# Patient Record
Sex: FEMALE | Race: WHITE | URBAN - METROPOLITAN AREA
[De-identification: names, ages, dates, MRNs, and addresses within clinical notes are randomized per-mention and may not be internally consistent; named-entity substitution may affect disease eponyms.]

---

## 2017-06-06 ENCOUNTER — HISTORICAL (OUTPATIENT)
Dept: ADMINISTRATIVE | Facility: HOSPITAL | Age: 62
End: 2017-06-06

## 2017-06-06 LAB
ABS NEUT (OLG): 4.9 X10(3)/MCL (ref 2.1–9.2)
BASOPHILS # BLD AUTO: 0.1 X10(3)/MCL
BASOPHILS NFR BLD AUTO: 1 % (ref 0–2)
BUN SERPL-MCNC: 26 MG/DL (ref 7–18)
CALCIUM SERPL-MCNC: 9 MG/DL (ref 8.5–10.1)
CHLORIDE SERPL-SCNC: 109 MMOL/L (ref 98–107)
CO2 SERPL-SCNC: 33 MMOL/L (ref 21–32)
CREAT SERPL-MCNC: 0.88 MG/DL (ref 0.6–1.3)
EOSINOPHIL # BLD AUTO: 0.2 X10(3)/MCL
EOSINOPHIL NFR BLD AUTO: 2 %
ERYTHROCYTE [DISTWIDTH] IN BLOOD BY AUTOMATED COUNT: 17.6 % (ref 11.5–17)
GLUCOSE SERPL-MCNC: 87 MG/DL (ref 74–106)
HCT VFR BLD AUTO: 33.8 % (ref 37–47)
HGB BLD-MCNC: 10.4 GM/DL (ref 12–16)
LYMPHOCYTES # BLD AUTO: 1.9 X10(3)/MCL
LYMPHOCYTES NFR BLD AUTO: 24 % (ref 13–40)
MCH RBC QN AUTO: 26.5 PG (ref 27–31)
MCHC RBC AUTO-ENTMCNC: 30.9 GM/DL (ref 33–36)
MCV RBC AUTO: 86 FL (ref 80–94)
MONOCYTES # BLD AUTO: 0.6 X10(3)/MCL
MONOCYTES NFR BLD AUTO: 8 % (ref 2–11)
NEUTROPHILS # BLD AUTO: 4.9 X10(3)/MCL (ref 2.1–9.2)
NEUTROPHILS NFR BLD AUTO: 64 % (ref 47–80)
PLATELET # BLD AUTO: 347 X10(3)/MCL (ref 130–400)
PMV BLD AUTO: 9.5 FL (ref 7.4–10.4)
POTASSIUM SERPL-SCNC: 5.3 MMOL/L (ref 3.5–5.1)
RBC # BLD AUTO: 3.93 X10(6)/MCL (ref 4.2–5.4)
SODIUM SERPL-SCNC: 146 MMOL/L (ref 136–145)
WBC # SPEC AUTO: 7.7 X10(3)/MCL (ref 4.5–11.5)

## 2018-02-05 ENCOUNTER — HOSPITAL ENCOUNTER (OUTPATIENT)
Dept: MEDSURG UNIT | Facility: HOSPITAL | Age: 63
End: 2018-02-07
Attending: INTERNAL MEDICINE | Admitting: INTERNAL MEDICINE

## 2018-02-06 LAB
BNP BLD-MCNC: 1117 PG/ML (ref 0–125)
PHENYTOIN SERPL-MCNC: 0.4 MCG/ML (ref 10–20)

## 2018-02-07 LAB
ABS NEUT (OLG): 8.8 X10(3)/MCL (ref 2.1–9.2)
ALBUMIN SERPL-MCNC: 3.1 GM/DL (ref 3.4–5)
ALBUMIN/GLOB SERPL: 0.8 RATIO (ref 1.1–2)
ALP SERPL-CCNC: 129 UNIT/L (ref 46–116)
ALT SERPL-CCNC: 38 UNIT/L (ref 12–78)
AMMONIA PLAS-MSCNC: <14 MCG/DL (ref 14–44.8)
AST SERPL-CCNC: 20 UNIT/L (ref 15–37)
BASOPHILS # BLD AUTO: 0.1 X10(3)/MCL
BASOPHILS NFR BLD AUTO: 1 % (ref 0–2)
BILIRUB SERPL-MCNC: 0.6 MG/DL (ref 0.2–1)
BILIRUBIN DIRECT+TOT PNL SERPL-MCNC: 0.3 MG/DL (ref 0–0.2)
BILIRUBIN DIRECT+TOT PNL SERPL-MCNC: 0.31 MG/DL (ref 0–0.8)
BUN SERPL-MCNC: 13.4 MG/DL (ref 7–18)
CALCIUM SERPL-MCNC: 9.2 MG/DL (ref 8.5–10.1)
CHLORIDE SERPL-SCNC: 107 MMOL/L (ref 98–107)
CO2 SERPL-SCNC: 30.1 MMOL/L (ref 21–32)
CREAT SERPL-MCNC: 0.8 MG/DL (ref 0.6–1.3)
EOSINOPHIL NFR BLD AUTO: 0 %
ERYTHROCYTE [DISTWIDTH] IN BLOOD BY AUTOMATED COUNT: 19.9 % (ref 11.5–17)
GLOBULIN SER-MCNC: 3.8 GM/DL (ref 2.4–3.5)
GLUCOSE SERPL-MCNC: 103 MG/DL (ref 74–106)
HCT VFR BLD AUTO: 34.7 % (ref 37–47)
HGB BLD-MCNC: 10.6 GM/DL (ref 12–16)
LYMPHOCYTES # BLD AUTO: 1.4 X10(3)/MCL
LYMPHOCYTES NFR BLD AUTO: 13 % (ref 13–40)
MCH RBC QN AUTO: 22 PG (ref 27–31)
MCHC RBC AUTO-ENTMCNC: 30.5 GM/DL (ref 33–36)
MCV RBC AUTO: 72.3 FL (ref 80–94)
MONOCYTES # BLD AUTO: 0.7 X10(3)/MCL
MONOCYTES NFR BLD AUTO: 7 % (ref 2–11)
NEUTROPHILS # BLD AUTO: 8.8 X10(3)/MCL (ref 2.1–9.2)
NEUTROPHILS NFR BLD AUTO: 80 % (ref 47–80)
PLATELET # BLD AUTO: 352 X10(3)/MCL (ref 130–400)
PMV BLD AUTO: 9.5 FL (ref 7.4–10.4)
POTASSIUM SERPL-SCNC: 2.8 MMOL/L (ref 3.5–5.1)
PROT SERPL-MCNC: 6.9 GM/DL (ref 6.4–8.2)
RBC # BLD AUTO: 4.8 X10(6)/MCL (ref 4.2–5.4)
SODIUM SERPL-SCNC: 150 MMOL/L (ref 136–145)
WBC # SPEC AUTO: 11 X10(3)/MCL (ref 4.5–11.5)

## 2018-05-16 ENCOUNTER — HOSPITAL ENCOUNTER (OUTPATIENT)
Dept: MEDSURG UNIT | Facility: HOSPITAL | Age: 63
End: 2018-05-17
Attending: INTERNAL MEDICINE | Admitting: INTERNAL MEDICINE

## 2018-05-16 LAB
GROUP & RH: NORMAL
PRODUCT READY: NORMAL
TRANSFUSION ORDER: NORMAL

## 2018-05-17 LAB
ABS NEUT (OLG): 9.4 X10(3)/MCL (ref 2.1–9.2)
ANISOCYTOSIS BLD QL SMEAR: NORMAL
BASOPHILS # BLD AUTO: 0.1 X10(3)/MCL
BASOPHILS NFR BLD AUTO: 1 % (ref 0–2)
EOSINOPHIL # BLD AUTO: 0.2 X10(3)/MCL
EOSINOPHIL NFR BLD AUTO: 2 %
ERYTHROCYTE [DISTWIDTH] IN BLOOD BY AUTOMATED COUNT: 20.8 % (ref 11.5–17)
HCT VFR BLD AUTO: 28.2 % (ref 37–47)
HGB BLD-MCNC: 8.8 GM/DL (ref 12–16)
LYMPHOCYTES # BLD AUTO: 0.6 X10(3)/MCL
LYMPHOCYTES NFR BLD AUTO: 6 % (ref 13–40)
MCH RBC QN AUTO: 24.8 PG (ref 27–31)
MCHC RBC AUTO-ENTMCNC: 31 GM/DL (ref 33–36)
MCV RBC AUTO: 80 FL (ref 80–94)
MONOCYTES # BLD AUTO: 0.6 X10(3)/MCL
MONOCYTES NFR BLD AUTO: 5 % (ref 2–11)
NEUTROPHILS # BLD AUTO: 9.4 X10(3)/MCL (ref 2.1–9.2)
NEUTROPHILS NFR BLD AUTO: 86 % (ref 47–80)
PLATELET # BLD AUTO: 142 X10(3)/MCL (ref 130–400)
PLATELET # BLD EST: NORMAL 10*3/UL
PMV BLD AUTO: 10.4 FL (ref 7.4–10.4)
POLYCHROMASIA BLD QL SMEAR: NORMAL
RBC # BLD AUTO: 3.53 X10(6)/MCL (ref 4.2–5.4)
RBC MORPH BLD: NORMAL
WBC # SPEC AUTO: 10.9 X10(3)/MCL (ref 4.5–11.5)

## 2018-05-23 LAB
FINAL CULTURE: NORMAL
FINAL CULTURE: NORMAL

## 2018-06-19 ENCOUNTER — HISTORICAL (OUTPATIENT)
Dept: INFUSION THERAPY | Facility: HOSPITAL | Age: 63
End: 2018-06-19

## 2018-06-25 ENCOUNTER — HISTORICAL (OUTPATIENT)
Dept: INFUSION THERAPY | Facility: HOSPITAL | Age: 63
End: 2018-06-25

## 2018-07-02 ENCOUNTER — HISTORICAL (OUTPATIENT)
Dept: INFUSION THERAPY | Facility: HOSPITAL | Age: 63
End: 2018-07-02

## 2018-07-03 ENCOUNTER — HISTORICAL (OUTPATIENT)
Dept: WOUND CARE | Facility: HOSPITAL | Age: 63
End: 2018-07-03

## 2018-07-05 ENCOUNTER — HISTORICAL (OUTPATIENT)
Dept: RADIOLOGY | Facility: HOSPITAL | Age: 63
End: 2018-07-05

## 2019-11-26 ENCOUNTER — HISTORICAL (OUTPATIENT)
Dept: LAB | Facility: HOSPITAL | Age: 64
End: 2019-11-26

## 2019-11-26 LAB
APPEARANCE, UA: ABNORMAL
BACTERIA SPEC CULT: ABNORMAL
BILIRUB UR QL STRIP: NEGATIVE
COLOR UR: YELLOW
GLUCOSE (UA): NEGATIVE
HGB UR QL STRIP: ABNORMAL
HYALINE CASTS #/AREA URNS LPF: ABNORMAL /[LPF]
KETONES UR QL STRIP: NEGATIVE
LEUKOCYTE ESTERASE UR QL STRIP: ABNORMAL
NITRITE UR QL STRIP: POSITIVE
PH UR STRIP: 6 [PH] (ref 5–9)
PROT UR QL STRIP: 30
RBC #/AREA URNS HPF: ABNORMAL /HPF
SP GR UR STRIP: 1.02 (ref 1–1.03)
SQUAMOUS EPITHELIAL, UA: ABNORMAL
UROBILINOGEN UR STRIP-ACNC: 0.2
WBC #/AREA URNS HPF: ABNORMAL /HPF

## 2020-01-23 LAB
ABS NEUT (OLG): 4.71 X10(3)/MCL (ref 2.1–9.2)
BASOPHILS # BLD AUTO: 0.01 X10(3)/MCL (ref 0–0.2)
BASOPHILS NFR BLD AUTO: 0.1 % (ref 0–1)
BUN SERPL-MCNC: 15 MG/DL (ref 7–18)
CALCIUM SERPL-MCNC: 7.9 MG/DL (ref 8.5–10.1)
CHLORIDE SERPL-SCNC: 105 MMOL/L (ref 98–107)
CO2 SERPL-SCNC: 39 MMOL/L (ref 21–32)
CREAT SERPL-MCNC: 0.61 MG/DL (ref 0.55–1.02)
CREAT/UREA NIT SERPL: 25
EOSINOPHIL # BLD AUTO: 0.01 X10(3)/MCL (ref 0–0.9)
EOSINOPHIL NFR BLD AUTO: 0.1 % (ref 0–6.4)
ERYTHROCYTE [DISTWIDTH] IN BLOOD BY AUTOMATED COUNT: 15.9 % (ref 11.5–17)
GLUCOSE SERPL-MCNC: 87 MG/DL (ref 74–106)
HCT VFR BLD AUTO: 37.5 % (ref 37–47)
HGB BLD-MCNC: 11.3 GM/DL (ref 12–16)
IMM GRANULOCYTES # BLD AUTO: 0.12 10*3/UL (ref 0–0.02)
IMM GRANULOCYTES NFR BLD AUTO: 1.8 % (ref 0–0.43)
LYMPHOCYTES # BLD AUTO: 1.48 X10(3)/MCL (ref 0.6–4.6)
LYMPHOCYTES NFR BLD AUTO: 21.9 % (ref 16–44)
MCH RBC QN AUTO: 28.5 PG (ref 27–31)
MCHC RBC AUTO-ENTMCNC: 30.1 GM/DL (ref 33–36)
MCV RBC AUTO: 94.7 FL (ref 80–94)
MONOCYTES # BLD AUTO: 0.42 X10(3)/MCL (ref 0.1–1.3)
MONOCYTES NFR BLD AUTO: 6.2 % (ref 4–12.1)
NEUTROPHILS # BLD AUTO: 4.71 X10(3)/MCL (ref 2.1–9.2)
NEUTROPHILS NFR BLD AUTO: 69.9 % (ref 43–73)
NRBC BLD AUTO-RTO: 0 % (ref 0–0.2)
PLATELET # BLD AUTO: 227 X10(3)/MCL (ref 130–400)
PMV BLD AUTO: 10.6 FL (ref 7.4–10.4)
POTASSIUM SERPL-SCNC: 3.1 MMOL/L (ref 3.5–5.1)
PREALB SERPL-MCNC: 28.1 MG/DL (ref 20–40)
RBC # BLD AUTO: 3.96 X10(6)/MCL (ref 4.2–5.4)
SODIUM SERPL-SCNC: 145 MMOL/L (ref 136–145)
WBC # SPEC AUTO: 6.8 X10(3)/MCL (ref 4.5–11.5)

## 2020-01-24 LAB
BUN SERPL-MCNC: 22 MG/DL (ref 7–18)
CALCIUM SERPL-MCNC: 8.2 MG/DL (ref 8.5–10.1)
CHLORIDE SERPL-SCNC: 108 MMOL/L (ref 98–107)
CO2 SERPL-SCNC: 34 MMOL/L (ref 21–32)
CREAT SERPL-MCNC: 0.78 MG/DL (ref 0.55–1.02)
CREAT/UREA NIT SERPL: 28
GLUCOSE SERPL-MCNC: 129 MG/DL (ref 74–106)
MAGNESIUM SERPL-MCNC: 2.1 MG/DL (ref 1.8–2.4)
POTASSIUM SERPL-SCNC: 4.6 MMOL/L (ref 3.5–5.1)
SODIUM SERPL-SCNC: 145 MMOL/L (ref 136–145)

## 2020-01-27 LAB
ABS NEUT (OLG): 10.24 X10(3)/MCL (ref 2.1–9.2)
ANISOCYTOSIS BLD QL SMEAR: ABNORMAL
APPEARANCE, UA: CLEAR
BASOPHILS # BLD AUTO: 0.05 X10(3)/MCL (ref 0–0.2)
BASOPHILS NFR BLD AUTO: 0.4 % (ref 0–1)
BILIRUB UR QL STRIP: NEGATIVE
BUN SERPL-MCNC: 36 MG/DL (ref 7–18)
CALCIUM SERPL-MCNC: 8.8 MG/DL (ref 8.5–10.1)
CHLORIDE SERPL-SCNC: 101 MMOL/L (ref 98–107)
CO2 SERPL-SCNC: 37 MMOL/L (ref 21–32)
COLOR UR: YELLOW
CREAT SERPL-MCNC: 0.72 MG/DL (ref 0.55–1.02)
CREAT/UREA NIT SERPL: 50
EOSINOPHIL # BLD AUTO: 0.02 X10(3)/MCL (ref 0–0.9)
EOSINOPHIL NFR BLD AUTO: 0.2 % (ref 0–6.4)
ERYTHROCYTE [DISTWIDTH] IN BLOOD BY AUTOMATED COUNT: 16 % (ref 11.5–17)
GLUCOSE (UA): NEGATIVE
GLUCOSE SERPL-MCNC: 110 MG/DL (ref 74–106)
HCT VFR BLD AUTO: 35.6 % (ref 37–47)
HGB BLD-MCNC: 10.6 GM/DL (ref 12–16)
HGB UR QL STRIP: NEGATIVE
HYPOCHROMIA BLD QL SMEAR: ABNORMAL
IMM GRANULOCYTES # BLD AUTO: 0.32 10*3/UL (ref 0–0.02)
IMM GRANULOCYTES NFR BLD AUTO: 2.4 % (ref 0–0.43)
KETONES UR QL STRIP: NEGATIVE
LEUKOCYTE ESTERASE UR QL STRIP: NEGATIVE
LYMPHOCYTES # BLD AUTO: 1.88 X10(3)/MCL (ref 0.6–4.6)
LYMPHOCYTES NFR BLD AUTO: 14.3 % (ref 16–44)
MCH RBC QN AUTO: 27.7 PG (ref 27–31)
MCHC RBC AUTO-ENTMCNC: 29.8 GM/DL (ref 33–36)
MCV RBC AUTO: 93.2 FL (ref 80–94)
MONOCYTES # BLD AUTO: 0.63 X10(3)/MCL (ref 0.1–1.3)
MONOCYTES NFR BLD AUTO: 4.8 % (ref 4–12.1)
NEUTROPHILS # BLD AUTO: 10.24 X10(3)/MCL (ref 2.1–9.2)
NEUTROPHILS NFR BLD AUTO: 77.9 % (ref 43–73)
NITRITE UR QL STRIP: NEGATIVE
NRBC BLD AUTO-RTO: 0 % (ref 0–0.2)
PH UR STRIP: 6 [PH] (ref 5–7)
PLATELET # BLD AUTO: 245 X10(3)/MCL (ref 130–400)
PLATELET # BLD EST: ADEQUATE 10*3/UL
PMV BLD AUTO: 11.7 FL (ref 7.4–10.4)
POTASSIUM SERPL-SCNC: 4.3 MMOL/L (ref 3.5–5.1)
PROT UR QL STRIP: NEGATIVE
RBC # BLD AUTO: 3.82 X10(6)/MCL (ref 4.2–5.4)
RBC MORPH BLD: ABNORMAL
SODIUM SERPL-SCNC: 139 MMOL/L (ref 136–145)
SP GR UR STRIP: 1.01 (ref 1–1.03)
UROBILINOGEN UR STRIP-ACNC: NEGATIVE
WBC # SPEC AUTO: 13.1 X10(3)/MCL (ref 4.5–11.5)

## 2020-01-28 LAB
ABS NEUT (OLG): 10.39 X10(3)/MCL (ref 2.1–9.2)
BASOPHILS # BLD AUTO: 0.04 X10(3)/MCL (ref 0–0.2)
BASOPHILS NFR BLD AUTO: 0.3 % (ref 0–1)
BUN SERPL-MCNC: 43 MG/DL (ref 7–18)
CALCIUM SERPL-MCNC: 8.7 MG/DL (ref 8.5–10.1)
CHLORIDE SERPL-SCNC: 102 MMOL/L (ref 98–107)
CO2 SERPL-SCNC: 34 MMOL/L (ref 21–32)
CREAT SERPL-MCNC: 0.68 MG/DL (ref 0.55–1.02)
CREAT/UREA NIT SERPL: 63
EOSINOPHIL # BLD AUTO: 0.02 X10(3)/MCL (ref 0–0.9)
EOSINOPHIL NFR BLD AUTO: 0.2 % (ref 0–6.4)
ERYTHROCYTE [DISTWIDTH] IN BLOOD BY AUTOMATED COUNT: 15.9 % (ref 11.5–17)
GLUCOSE SERPL-MCNC: 103 MG/DL (ref 74–106)
HCT VFR BLD AUTO: 35.8 % (ref 37–47)
HGB BLD-MCNC: 10.8 GM/DL (ref 12–16)
IMM GRANULOCYTES # BLD AUTO: 0.2 10*3/UL (ref 0–0.02)
IMM GRANULOCYTES NFR BLD AUTO: 1.5 % (ref 0–0.43)
LYMPHOCYTES # BLD AUTO: 1.89 X10(3)/MCL (ref 0.6–4.6)
LYMPHOCYTES NFR BLD AUTO: 14.4 % (ref 16–44)
MCH RBC QN AUTO: 28 PG (ref 27–31)
MCHC RBC AUTO-ENTMCNC: 30.2 GM/DL (ref 33–36)
MCV RBC AUTO: 92.7 FL (ref 80–94)
MONOCYTES # BLD AUTO: 0.55 X10(3)/MCL (ref 0.1–1.3)
MONOCYTES NFR BLD AUTO: 4.2 % (ref 4–12.1)
NEUTROPHILS # BLD AUTO: 10.39 X10(3)/MCL (ref 2.1–9.2)
NEUTROPHILS NFR BLD AUTO: 79.4 % (ref 43–73)
NRBC BLD AUTO-RTO: 0 % (ref 0–0.2)
PLATELET # BLD AUTO: 204 X10(3)/MCL (ref 130–400)
PMV BLD AUTO: 11.4 FL (ref 7.4–10.4)
POTASSIUM SERPL-SCNC: 4.7 MMOL/L (ref 3.5–5.1)
RBC # BLD AUTO: 3.86 X10(6)/MCL (ref 4.2–5.4)
SODIUM SERPL-SCNC: 141 MMOL/L (ref 136–145)
WBC # SPEC AUTO: 13.1 X10(3)/MCL (ref 4.5–11.5)

## 2020-02-03 ENCOUNTER — HISTORICAL (OUTPATIENT)
Dept: ADMINISTRATIVE | Facility: HOSPITAL | Age: 65
End: 2020-02-03

## 2020-02-03 LAB
ABS NEUT (OLG): 6.01 X10(3)/MCL (ref 2.1–9.2)
ALBUMIN SERPL-MCNC: 2.7 GM/DL (ref 3.4–5)
ANISOCYTOSIS BLD QL SMEAR: ABNORMAL
BUN SERPL-MCNC: 41 MG/DL (ref 7–18)
CALCIUM SERPL-MCNC: 9.1 MG/DL (ref 8.5–10.1)
CHLORIDE SERPL-SCNC: 106 MMOL/L (ref 98–107)
CO2 SERPL-SCNC: 34 MMOL/L (ref 21–32)
CREAT SERPL-MCNC: 0.74 MG/DL (ref 0.55–1.02)
ERYTHROCYTE [DISTWIDTH] IN BLOOD BY AUTOMATED COUNT: 16.6 % (ref 11.5–17)
GLUCOSE SERPL-MCNC: 87 MG/DL (ref 74–106)
HCT VFR BLD AUTO: 31.4 % (ref 37–47)
HGB BLD-MCNC: 9.4 GM/DL (ref 12–16)
HYPOCHROMIA BLD QL SMEAR: ABNORMAL
MACROCYTES BLD QL SMEAR: SLIGHT
MAGNESIUM SERPL-MCNC: 2.3 MG/DL (ref 1.8–2.4)
MCH RBC QN AUTO: 29.3 PG (ref 27–31)
MCHC RBC AUTO-ENTMCNC: 29.9 GM/DL (ref 33–36)
MCV RBC AUTO: 97.8 FL (ref 80–94)
MICROCYTES BLD QL SMEAR: SLIGHT
NRBC BLD AUTO-RTO: 0 % (ref 0–0.2)
PHOSPHATE SERPL-MCNC: 5.4 MG/DL (ref 2.5–4.9)
PLATELET # BLD AUTO: 169 X10(3)/MCL (ref 130–400)
PLATELET # BLD EST: ADEQUATE 10*3/UL
PMV BLD AUTO: 12.3 FL (ref 7.4–10.4)
POTASSIUM SERPL-SCNC: 4.7 MMOL/L (ref 3.5–5.1)
RBC # BLD AUTO: 3.21 X10(6)/MCL (ref 4.2–5.4)
RBC MORPH BLD: ABNORMAL
SODIUM SERPL-SCNC: 143 MMOL/L (ref 136–145)
WBC # SPEC AUTO: 8.4 X10(3)/MCL (ref 4.5–11.5)

## 2020-02-11 LAB
ABS NEUT (OLG): 3.99 X10(3)/MCL (ref 2.1–9.2)
BASOPHILS # BLD AUTO: 0.04 X10(3)/MCL (ref 0–0.2)
BASOPHILS NFR BLD AUTO: 0.6 % (ref 0–1)
BUN SERPL-MCNC: 77 MG/DL (ref 7–18)
CALCIUM SERPL-MCNC: 8.6 MG/DL (ref 8.5–10.1)
CHLORIDE SERPL-SCNC: 108 MMOL/L (ref 98–107)
CO2 SERPL-SCNC: 34 MMOL/L (ref 21–32)
CREAT SERPL-MCNC: 1.33 MG/DL (ref 0.55–1.02)
CREAT/UREA NIT SERPL: 58
EOSINOPHIL # BLD AUTO: 0.19 X10(3)/MCL (ref 0–0.9)
EOSINOPHIL NFR BLD AUTO: 3.1 % (ref 0–6.4)
ERYTHROCYTE [DISTWIDTH] IN BLOOD BY AUTOMATED COUNT: 17.2 % (ref 11.5–17)
GLUCOSE SERPL-MCNC: 140 MG/DL (ref 74–106)
HCT VFR BLD AUTO: 30.8 % (ref 37–47)
HGB BLD-MCNC: 9 GM/DL (ref 12–16)
HYPOCHROMIA BLD QL SMEAR: NORMAL
IMM GRANULOCYTES # BLD AUTO: 0.03 10*3/UL (ref 0–0.02)
IMM GRANULOCYTES NFR BLD AUTO: 0.5 % (ref 0–0.43)
LYMPHOCYTES # BLD AUTO: 1.38 X10(3)/MCL (ref 0.6–4.6)
LYMPHOCYTES NFR BLD AUTO: 22.3 % (ref 16–44)
MCH RBC QN AUTO: 29.4 PG (ref 27–31)
MCHC RBC AUTO-ENTMCNC: 29.2 GM/DL (ref 33–36)
MCV RBC AUTO: 100.7 FL (ref 80–94)
MONOCYTES # BLD AUTO: 0.56 X10(3)/MCL (ref 0.1–1.3)
MONOCYTES NFR BLD AUTO: 9 % (ref 4–12.1)
NEUTROPHILS # BLD AUTO: 3.99 X10(3)/MCL (ref 2.1–9.2)
NEUTROPHILS NFR BLD AUTO: 64.5 % (ref 43–73)
NRBC BLD AUTO-RTO: 0 % (ref 0–0.2)
PLATELET # BLD AUTO: 159 X10(3)/MCL (ref 130–400)
PLATELET # BLD EST: ADEQUATE 10*3/UL
PMV BLD AUTO: 12 FL (ref 7.4–10.4)
POTASSIUM SERPL-SCNC: 5.2 MMOL/L (ref 3.5–5.1)
RBC # BLD AUTO: 3.06 X10(6)/MCL (ref 4.2–5.4)
SODIUM SERPL-SCNC: 144 MMOL/L (ref 136–145)
WBC # SPEC AUTO: 6.2 X10(3)/MCL (ref 4.5–11.5)

## 2020-02-12 LAB
BUN SERPL-MCNC: 68 MG/DL (ref 7–18)
CALCIUM SERPL-MCNC: 8.9 MG/DL (ref 8.5–10.1)
CHLORIDE SERPL-SCNC: 104 MMOL/L (ref 98–107)
CO2 SERPL-SCNC: 34 MMOL/L (ref 21–32)
CREAT SERPL-MCNC: 1.28 MG/DL (ref 0.55–1.02)
CREAT/UREA NIT SERPL: 53
GLUCOSE SERPL-MCNC: 89 MG/DL (ref 74–106)
POTASSIUM SERPL-SCNC: 5 MMOL/L (ref 3.5–5.1)
SODIUM SERPL-SCNC: 143 MMOL/L (ref 136–145)

## 2020-02-16 LAB
BUN SERPL-MCNC: 41 MG/DL (ref 7–18)
CALCIUM SERPL-MCNC: 9 MG/DL (ref 8.5–10.1)
CHLORIDE SERPL-SCNC: 108 MMOL/L (ref 98–107)
CO2 SERPL-SCNC: 28 MMOL/L (ref 21–32)
CREAT SERPL-MCNC: 0.84 MG/DL (ref 0.55–1.02)
CREAT/UREA NIT SERPL: 49
GLUCOSE SERPL-MCNC: 88 MG/DL (ref 74–106)
POTASSIUM SERPL-SCNC: 5.7 MMOL/L (ref 3.5–5.1)
SODIUM SERPL-SCNC: 139 MMOL/L (ref 136–145)

## 2020-02-17 LAB
ABS NEUT (OLG): 1.98 X10(3)/MCL (ref 2.1–9.2)
ANISOCYTOSIS BLD QL SMEAR: ABNORMAL
BASOPHILS # BLD AUTO: 0.03 X10(3)/MCL (ref 0–0.2)
BASOPHILS NFR BLD AUTO: 0.8 % (ref 0–1)
BUN SERPL-MCNC: 34 MG/DL (ref 7–18)
CALCIUM SERPL-MCNC: 8.5 MG/DL (ref 8.5–10.1)
CHLORIDE SERPL-SCNC: 108 MMOL/L (ref 98–107)
CO2 SERPL-SCNC: 32 MMOL/L (ref 21–32)
CREAT SERPL-MCNC: 0.94 MG/DL (ref 0.55–1.02)
CREAT/UREA NIT SERPL: 36
EOSINOPHIL # BLD AUTO: 0.21 X10(3)/MCL (ref 0–0.9)
EOSINOPHIL NFR BLD AUTO: 5.4 % (ref 0–6.4)
ERYTHROCYTE [DISTWIDTH] IN BLOOD BY AUTOMATED COUNT: 16.4 % (ref 11.5–17)
GLUCOSE SERPL-MCNC: 88 MG/DL (ref 74–106)
HCT VFR BLD AUTO: 31.7 % (ref 37–47)
HGB BLD-MCNC: 9.2 GM/DL (ref 12–16)
HYPOCHROMIA BLD QL SMEAR: ABNORMAL
IMM GRANULOCYTES # BLD AUTO: 0.02 10*3/UL (ref 0–0.02)
IMM GRANULOCYTES NFR BLD AUTO: 0.5 % (ref 0–0.43)
LYMPHOCYTES # BLD AUTO: 1.24 X10(3)/MCL (ref 0.6–4.6)
LYMPHOCYTES NFR BLD AUTO: 32 % (ref 16–44)
MACROCYTES BLD QL SMEAR: SLIGHT
MCH RBC QN AUTO: 28.8 PG (ref 27–31)
MCHC RBC AUTO-ENTMCNC: 29 GM/DL (ref 33–36)
MCV RBC AUTO: 99.4 FL (ref 80–94)
MONOCYTES # BLD AUTO: 0.4 X10(3)/MCL (ref 0.1–1.3)
MONOCYTES NFR BLD AUTO: 10.3 % (ref 4–12.1)
NEUTROPHILS # BLD AUTO: 1.98 X10(3)/MCL (ref 2.1–9.2)
NEUTROPHILS NFR BLD AUTO: 51 % (ref 43–73)
NRBC BLD AUTO-RTO: 0 % (ref 0–0.2)
PLATELET # BLD AUTO: 164 X10(3)/MCL (ref 130–400)
PLATELET # BLD EST: ADEQUATE 10*3/UL
PMV BLD AUTO: 12 FL (ref 7.4–10.4)
POTASSIUM SERPL-SCNC: 4.3 MMOL/L (ref 3.5–5.1)
RBC # BLD AUTO: 3.19 X10(6)/MCL (ref 4.2–5.4)
RBC MORPH BLD: ABNORMAL
SODIUM SERPL-SCNC: 144 MMOL/L (ref 136–145)
WBC # SPEC AUTO: 3.9 X10(3)/MCL (ref 4.5–11.5)

## 2020-02-19 LAB
BUN SERPL-MCNC: 21 MG/DL (ref 7–18)
CALCIUM SERPL-MCNC: 8.9 MG/DL (ref 8.5–10.1)
CHLORIDE SERPL-SCNC: 111 MMOL/L (ref 98–107)
CO2 SERPL-SCNC: 31 MMOL/L (ref 21–32)
CREAT SERPL-MCNC: 0.77 MG/DL (ref 0.55–1.02)
CREAT/UREA NIT SERPL: 27
GLUCOSE SERPL-MCNC: 88 MG/DL (ref 74–106)
POTASSIUM SERPL-SCNC: 4.6 MMOL/L (ref 3.5–5.1)
SODIUM SERPL-SCNC: 145 MMOL/L (ref 136–145)

## 2020-03-10 ENCOUNTER — HISTORICAL (OUTPATIENT)
Dept: ADMINISTRATIVE | Facility: HOSPITAL | Age: 65
End: 2020-03-10

## 2020-03-16 LAB
FINAL CULTURE: NORMAL
FINAL CULTURE: NORMAL

## 2020-05-02 ENCOUNTER — HISTORICAL (OUTPATIENT)
Dept: ADMINISTRATIVE | Facility: HOSPITAL | Age: 65
End: 2020-05-02

## 2020-05-04 LAB — FINAL CULTURE: NORMAL

## 2021-11-16 ENCOUNTER — HISTORICAL (OUTPATIENT)
Dept: ADMINISTRATIVE | Facility: HOSPITAL | Age: 66
End: 2021-11-16

## 2021-11-22 LAB — FINAL CULTURE: NORMAL

## 2022-04-30 NOTE — DISCHARGE SUMMARY
Patient:   Soumya Castellon            MRN: [id number]            FIN: [id number]               Age:   62 years     Sex:  Female     :  1955   Associated Diagnoses:   None   Author:   Finn Grimes MD      Chief Complaint   2018 10:55 CDT      found in poor hygeine and lethargic at home by home health, brought by EMS, arousable by verbal stimuli, oriented        History of Present Illness   CC: weakness  The patient has recurrences of lethargy and presenting with non responsiveness and usually wakes up 24-48 hours later and is DC.  significant other indicates she also takes other peoples medications. Dr Cantu and the SO are trying to get her placed in a NH for which she is willing and agrees to go. she presenting with several weeks of worsening weaknes, falls.  she also had some increased dyspnea.  she is slightly lethargic but answers questions. she continues to smoke. Her Iron levels were severely low.  she has no concering symptoms of weight loss, GERD, hematemesis, melena, hematuria etc.  she recieved 2 units and feels much better. We tried arranging Iron infusions but dr cantu is out of town. we left a message with the nurse. she is ready to be DC. She can Follow up with dr cantu for NH placement         Health Status   Current medications:  (Selected)   Inpatient Medications  Ordered  Breo Ellipta 100mcg- 25mcg/inh: 1 inh, form: Multi-Use, Oral, Daily, first dose 18 10:41:00 CDT  Dilantin 100 mg oral capsule, extended release: 300 mg, form: Cap-CR, Oral, qPM, first dose 18 21:00:00 CDT  Effexor  mg oral capsule, extended release: 150 mg, form: Cap-CR, Oral, At Bedtime, first dose 18 21:00:00 CDT  Ferrlecit 125 mg/ mL: 125 mg, IV Piggyback, Daily, Infuse over: 1 hr, Order duration: 8 dose(s), first dose 18 15:18:00 CDT, stop date 18 8:59:00 CDT  Lyrica 50 mg oral capsule: 100 mg, form: Cap, Oral, TID, first dose 18 14:00:00 CDT  NS (0.9%  Sodium Chloride) Infusion 1,000 mL: 1,000 mL, 1,000 mL, IV, 100 mL/hr, start date 05/16/18 14:55:00 CDT  Normal Saline (0.9% NS) IV: 1,000 mL, 1,000 mL, IV, 1000 mL/hr, start date 06/16/17 23:18:00 CDT, stop date 06/16/17 23:18:00 CDT, STAT  Pantoprazole 40 mg ORAL EC-Tablet: 40 mg, form: Tab-EC, Oral, Daily, first dose 05/17/18 6:00:00 CDT  Sodium Chloride 0.9% PF vial (For PICC Flush): 10 mL, form: Injection, IV Push, As Directed PRN for other (see comment), first dose 05/16/18 13:40:00 CDT, prior to administering IV meds and/or blood sampling  Sodium Chloride 0.9% PF vial (For PICC Flush): 10 mL, form: Injection, IV Push, q12hr, first dose 05/16/18 14:00:00 CDT, Flush each lumen following hospital policy for other flushing guidelines  Sodium Chloride 0.9% PF vial (For PICC Flush): 20 mL, form: Injection, IV Push, As Directed PRN for other (see comment), first dose 05/16/18 13:40:00 CDT, following administrations of IV medications and/or blood sampling  levetiracetam 500 mg oral tablet: 500 mg, form: Tab, Oral, BID, first dose 05/16/18 21:00:00 CDT  mirtazapine 15 mg oral tablet: 15 mg, form: Tab, Oral, Once a day (at bedtime), first dose 05/16/18 21:00:00 CDT  Pending Complete  Influenza Virus Vaccine, Inactivated: 0.5 mL, form: Susp, IM, Daily, Order duration: 1 dose(s), first dose 02/06/18 9:00:00 CST, stop date 02/07/18 8:59:00 CST  Documented Medications  Documented  Breo Ellipta 100 mcg-25 mcg/inh inhalation powder: 1 puff(s), INH, Daily, # 30 EA, 0 Refill(s)  HYDROCHLOROTHIAZIDE 25 MG TAB: 25 mg = 1 tab(s), Oral, Daily  LEVETIRACETAM 500 MG TABLET: 500 mg = 1 tab(s), Oral, BID  Lyrica 100 mg oral capsule: 100 mg = 1 cap(s), Oral, TID, 0 Refill(s)  PHENYTOIN SOD  MG CAP: 300 mg = 3 cap(s), Oral, qPM  Spiriva 18 mcg inhalation capsule: 18 mcg = 1 cap(s), INH, Daily, 0 Refill(s)  Ventolin HFA 90 mcg/inh inhalation aerosol: 180 mcg = 2 puff(s), INH, QID, PRN PRN wheezing, 0 Refill(s)  busPIRone 15 mg  oral tablet: 15 mg = 1 tab(s), Oral, BID, 0 Refill(s)  cholecalciferol 50,000 intl units oral capsule: 50,000 IntUnit = 1 cap(s), Oral, qWeek, 0 Refill(s)  ipratropium-albuterol 0.5 mg-3 mg/3 mL inhalation NEB solution: 0 Refill(s)  mirtazapine 15 mg oral tablet: 15 mg = 1 tab(s), Oral, Once a day (at bedtime), 0 Refill(s)  pantoprazole 40 mg tablet,delayed release (DR/EC): See Instructions  venlafaxine 150 mg capsule,extended release 24hr:       Physical Examination   General:  No acute distress, sleepy but awake.    Eye:  Pupils are equal, round and reactive to light, Extraocular movements are intact.    HENT:  Normal hearing, Oral mucosa is moist.    Neck:  Supple, Non-tender, No jugular venous distention.    Respiratory:  Respirations are non-labored, Symmetrical chest wall expansion, bilateral wheezing.    Cardiovascular:  Normal rate, Regular rhythm, No murmur, No gallop, Good pulses equal in all extremities.    Gastrointestinal:  Soft, Non-tender, Non-distended, Normal bowel sounds.       Vital Signs (last 24 hrs)_____  Last Charted___________  Temp Oral     36.8 DegC  (MAY 17 07:16)  Heart Rate Peripheral   73 bpm  (MAY 17 11:00)  Resp Rate         18 br/min  (MAY 17 07:00)  SBP      H 172mmHg  (MAY 17 11:00)  DBP      90 mmHg  (MAY 17 11:00)  SpO2      100 %  (MAY 17 11:00)  Weight      48.7 kg  (MAY 17 03:00)     Genitourinary:  No costovertebral angle tenderness, No inguinal tenderness.    Lymphatics:  No lymphadenopathy neck, axilla, groin.    Musculoskeletal:  Normal range of motion, Normal strength.    Integumentary:  Warm, Dry, Pink.    Neurologic:  Alert, Oriented, Normal motor function, No focal deficits.    Cognition and Speech:  Oriented, Speech clear and coherent.    Psychiatric:  Cooperative, Appropriate mood & affect.       Review / Management   Results review:     No qualifying data available.    Laboratory Results   Last 5 Days Lab Results : PowerNote Discrete Results   5/17/2018 5:10 CDT        WBC                       10.9 x10(3)/mcL                             RBC                       3.53 x10(6)/mcL  LOW                             Hgb                       8.8 gm/dL  LOW                             Hct                       28.2 %  LOW                             Platelet                  142 x10(3)/mcL                             MCV                       80.0 fL                             MCH                       24.8 pg  LOW                             MCHC                      31.0 gm/dL  LOW                             RDW                       20.8 %  HI                             MPV                       10.4 fL                             Abs Neut                  9.40 x10(3)/mcL  HI                             Neutro Auto               86 %  HI                             Lymph Auto                6 %  LOW                             Mono Auto                 5 %                             Eos Auto                  2 %  NA                             Abs Eos                   0.2 x10(3)/mcL  NA                             Basophil Auto             1 %                             Abs Neutro                9.40 x10(3)/mcL  HI                             Abs Lymph                 0.6 x10(3)/mcL  NA                             Abs Mono                  0.6 x10(3)/mcL  NA                             Abs Baso                  0.1 x10(3)/mcL  NA                             Platelet Est              Normal                             Anisocyte                 1+                             Polychrom                 1+                             RBC Morph                 Abnormal    5/16/2018 11:20 CDT      UA Appear                 Clear                             UA Color                  Yellow                             UA Spec Grav              1.015                             UA Bili                   Negative                             UA pH                     6.0                              UA Urobilinogen           0.2                             UA Blood                  Trace-intact                             UA Glucose                Negative                             UA Ketones                Negative                             UA Protein                Negative                             UA Nitrite                Negative                             UA Leuk Est               Negative                             UA WBC                    None Seen                             UA RBC                    None Seen                             UA Bacteria               None Seen                             UA Squam Epithelial       None Seen                             U pH                      6.0                             U Amph Scr                NEG.                             U Samantha Scr                NEG.                             U Benzodia Scr            NEG.                             U Cannab Scr              NEG.                             U Cocaine Scr             NEG.                             U Opiate Scr              NEG.                             U Phencyclidine Scr       NEG.    5/16/2018 11:10 CDT      WBC                       6.7 x10(3)/mcL                             RBC                       2.60 x10(6)/mcL  LOW                             Hgb                       6.0 gm/dL  LOW                             Hct                       20.9 %  LOW                             Platelet                  155 x10(3)/mcL                             MCV                       80.1 fL                             MCH                       23.2 pg  LOW                             MCHC                      29.0 gm/dL  LOW                             RDW                       22.7 %  HI                             MPV                       11.2 fL  HI                             Abs Neut                  3.50 x10(3)/mcL                             Neutro Auto                53 %                             Lymph Auto                33 %                             Mono Auto                 9 %                             Eos Auto                  4 %  NA                             Abs Eos                   0.3 x10(3)/mcL  NA                             Basophil Auto             1 %                             Abs Neutro                3.50 x10(3)/mcL                             Abs Lymph                 2.2 x10(3)/mcL  NA                             Abs Mono                  0.6 x10(3)/mcL  NA                             Abs Baso                  0.1 x10(3)/mcL  NA                             Sodium Lvl                149 mmol/L  HI                             Potassium Lvl             3.7 mmol/L                             Chloride                  113 mmol/L  HI                             CO2                       29.4 mmol/L                             Calcium Lvl               7.6 mg/dL  LOW                             Magnesium Lvl             2.2 mg/dL                             Glucose Lvl               90 mg/dL                             BUN                       29.8 mg/dL  HI                             Creatinine                0.90 mg/dL                             eGFR-AA                   >60 mL/min/1.73 m2  NA                             eGFR-MAURICE                  >60 mL/min/1.73 m2  NA                             Bili Total                0.3 mg/dL                             Bili Direct               0.06 mg/dL                             Bili Indirect             0.24 mg/dL                             AST                       21 unit/L                             ALT                       18 unit/L                             Alk Phos                  74 unit/L                             Total Protein             5.6 gm/dL  LOW                             Albumin Lvl               2.30 gm/dL  LOW                             Globulin                  3.30 gm/dL                              A/G Ratio                 0.7 ratio  LOW                             Iron Lvl                  12 mcg/dL  LOW                             Transferrin               307.0 mg/dL                             TIBC                      498 mcg/dL  HI                             Iron Sat                  2.4 %  LOW                             TSH                       2.505 mIU/mL                             Phenytoin Total           <0.5 mcg/mL  LOW           Impression and Plan   Iron defiency Anemia: will try and set her up for IV iron infusion thru dr bonilla. Unfortunately, I cannot order it and will defer to dr bonilla. she has recieved 2 doses of ferrlecit so will need 6 more or an equivalnet thereof.  IF she does not recieve she will need oral therapy with monitoring.    Recurrent admits for Nonresponsiveness. I would DC narcotics. Significant other states she takes other peopled meds.  doubtul this is postictal seizures. I luis a with NH placement.   COPD/Tobacco Abuse: continue home meds. STABLE  Hx of seizures: continue meds. appears stable.   time spent in DC process 35 minutes

## 2022-04-30 NOTE — H&P
Patient:   Soumya Castellon            MRN: [id number]            FIN: [id number]               Age:   62 years     Sex:  Female     :  1955   Associated Diagnoses:   None   Author:   Finn Grimes MD      Chief Complaint   2018 10:55 CDT      found in poor hygeine and lethargic at home by home health, brought by EMS, arousable by verbal stimuli, oriented        History of Present Illness   CC: weakness  The patient has recurrences of lethargy and presenting with non responsiveness and usually wakes up 24-48 hours later and is DC.  significant other indicates she also takes other peoples medications. Dr Cantu and the SO are trying to get her placed in a NH for which she is willing and agrees to go. she presenting with several weeks of worsening weaknes, falls.  she also had some increased dyspnea.  she is slightly lethargic but answers questions. she continues to smoke. Her Iron levels were severely low.  she has no concering symptoms of weight loss, GERD, hematemesis, melena, hematuria etc.  she recieved 2 units and feels much better. We tried arranging Iron infusions but dr cantu is out of town. we left a message with the nurse. she is ready to be DC. She can Follow up with dr cantu for NH placement         Review of Systems   Constitutional:  Fever, No chills.    Eye:  No blurring, No double vision.    Ear/Nose/Mouth/Throat:  Nasal congestion, No sore throat.    Respiratory:  Shortness of breath, Cough, Wheezing, No sputum production, No hemoptysis.    Cardiovascular:  No chest pain, No palpitations, No bradycardia, No tachycardia, No peripheral edema.    Gastrointestinal:  No nausea, No vomiting, No diarrhea, No constipation, No heartburn, No abdominal pain, No hematemesis.    Genitourinary:  No dysuria, No hematuria.    Hematology/Lymphatics:  No bruising tendency, No bleeding tendency.    Endocrine:  No excessive thirst, No polyuria.    Immunologic:  Not immunocompromised, No recurrent  fevers.    Musculoskeletal:  Neck pain, Joint pain.    Integumentary:  No rash, No pruritus, No abrasions.    Neurologic:  No abnormal balance, No confusion.    Psychiatric:  No anxiety, No depression.    All other systems are negative      Health Status   Current medications:  (Selected)   Inpatient Medications  Ordered  Breo Ellipta 100mcg- 25mcg/inh: 1 inh, form: Multi-Use, Oral, Daily, first dose 05/17/18 10:41:00 CDT  Dilantin 100 mg oral capsule, extended release: 300 mg, form: Cap-CR, Oral, qPM, first dose 05/16/18 21:00:00 CDT  Effexor  mg oral capsule, extended release: 150 mg, form: Cap-CR, Oral, At Bedtime, first dose 05/16/18 21:00:00 CDT  Ferrlecit 125 mg/ mL: 125 mg, IV Piggyback, Daily, Infuse over: 1 hr, Order duration: 8 dose(s), first dose 05/16/18 15:18:00 CDT, stop date 05/24/18 8:59:00 CDT  Lyrica 50 mg oral capsule: 100 mg, form: Cap, Oral, TID, first dose 05/16/18 14:00:00 CDT  NS (0.9% Sodium Chloride) Infusion 1,000 mL: 1,000 mL, 1,000 mL, IV, 100 mL/hr, start date 05/16/18 14:55:00 CDT  Normal Saline (0.9% NS) IV: 1,000 mL, 1,000 mL, IV, 1000 mL/hr, start date 06/16/17 23:18:00 CDT, stop date 06/16/17 23:18:00 CDT, STAT  Pantoprazole 40 mg ORAL EC-Tablet: 40 mg, form: Tab-EC, Oral, Daily, first dose 05/17/18 6:00:00 CDT  Sodium Chloride 0.9% PF vial (For PICC Flush): 10 mL, form: Injection, IV Push, As Directed PRN for other (see comment), first dose 05/16/18 13:40:00 CDT, prior to administering IV meds and/or blood sampling  Sodium Chloride 0.9% PF vial (For PICC Flush): 10 mL, form: Injection, IV Push, q12hr, first dose 05/16/18 14:00:00 CDT, Flush each lumen following hospital policy for other flushing guidelines  Sodium Chloride 0.9% PF vial (For PICC Flush): 20 mL, form: Injection, IV Push, As Directed PRN for other (see comment), first dose 05/16/18 13:40:00 CDT, following administrations of IV medications and/or blood sampling  levetiracetam 500 mg oral tablet: 500 mg,  form: Tab, Oral, BID, first dose 05/16/18 21:00:00 CDT  mirtazapine 15 mg oral tablet: 15 mg, form: Tab, Oral, Once a day (at bedtime), first dose 05/16/18 21:00:00 CDT  Pending Complete  Influenza Virus Vaccine, Inactivated: 0.5 mL, form: Susp, IM, Daily, Order duration: 1 dose(s), first dose 02/06/18 9:00:00 CST, stop date 02/07/18 8:59:00 CST  Documented Medications  Documented  Breo Ellipta 100 mcg-25 mcg/inh inhalation powder: 1 puff(s), INH, Daily, # 30 EA, 0 Refill(s)  HYDROCHLOROTHIAZIDE 25 MG TAB: 25 mg = 1 tab(s), Oral, Daily  LEVETIRACETAM 500 MG TABLET: 500 mg = 1 tab(s), Oral, BID  Lyrica 100 mg oral capsule: 100 mg = 1 cap(s), Oral, TID, 0 Refill(s)  PHENYTOIN SOD  MG CAP: 300 mg = 3 cap(s), Oral, qPM  Spiriva 18 mcg inhalation capsule: 18 mcg = 1 cap(s), INH, Daily, 0 Refill(s)  Ventolin HFA 90 mcg/inh inhalation aerosol: 180 mcg = 2 puff(s), INH, QID, PRN PRN wheezing, 0 Refill(s)  busPIRone 15 mg oral tablet: 15 mg = 1 tab(s), Oral, BID, 0 Refill(s)  cholecalciferol 50,000 intl units oral capsule: 50,000 IntUnit = 1 cap(s), Oral, qWeek, 0 Refill(s)  ipratropium-albuterol 0.5 mg-3 mg/3 mL inhalation NEB solution: 0 Refill(s)  mirtazapine 15 mg oral tablet: 15 mg = 1 tab(s), Oral, Once a day (at bedtime), 0 Refill(s)  pantoprazole 40 mg tablet,delayed release (DR/EC): See Instructions  venlafaxine 150 mg capsule,extended release 24hr:       Histories   Past Medical History:    Active  Seizure disorder (345.90)  Comments:  1/5/2016 CST 4:28 CST - SYSTEM  Problem automatically updated based on documentation on Capillary Refills, Brachial Pulses, Radial Pulses, Femoral Pulses, Dorsalis Pulses, Ulnar pulses, Popliteal Pulses, Postibial Pulses, Edemas, Affect/Behavior, Orientation Assessment, Arterial Line Site.  OA (osteoarthritis) ([id number])  Comments:  1/5/2016 CST 4:28 CST - SYSTEM  Problem automatically updated based on documentation on Capillary Refills, Brachial  Pulses, Radial Pulses, Femoral Pulses, Dorsalis Pulses, Ulnar pulses, Popliteal Pulses, Postibial Pulses, Edemas, Affect/Behavior, Orientation Assessment, Arterial Line Site.  HYPERTENSION (997.91)  Chronic pain (338.2)  Resolved  HLD (hyperlipidemia) ([id number]):  Resolved on 1/5/2016 at 60 years.  Comments:  1/5/2016 CST 4:28 CST - SYSTEM  Problem automatically updated based on documentation on Capillary Refills, Brachial Pulses, Radial Pulses, Femoral Pulses, Dorsalis Pulses, Ulnar pulses, Popliteal Pulses, Postibial Pulses, Edemas, Affect/Behavior, Orientation Assessment, Arterial Line Site.  History of bleeding ulcers (V12.71):  Resolved on 1/5/2016 at 60 years.  Comments:  1/5/2016 CST 4:28 CST - SYSTEM  Problem automatically updated based on documentation on Capillary Refills, Brachial Pulses, Radial Pulses, Femoral Pulses, Dorsalis Pulses, Ulnar pulses, Popliteal Pulses, Postibial Pulses, Edemas, Affect/Behavior, Orientation Assessment, Arterial Line Site.  Duodenal ulcer ([id number]):  Resolved on 1/5/2016 at 60 years.  Comments:  1/5/2016 CST 4:28 CST - SYSTEM  Problem automatically updated based on documentation on Capillary Refills, Brachial Pulses, Radial Pulses, Femoral Pulses, Dorsalis Pulses, Ulnar pulses, Popliteal Pulses, Postibial Pulses, Edemas, Affect/Behavior, Orientation Assessment, Arterial Line Site.  DJD (degenerative joint disease) ([id number]):  Resolved on 1/5/2016 at 60 years.  Comments:  1/5/2016 CST 4:28 CST - SYSTEM  Problem automatically updated based on documentation on Capillary Refills, Brachial Pulses, Radial Pulses, Femoral Pulses, Dorsalis Pulses, Ulnar pulses, Popliteal Pulses, Postibial Pulses, Edemas, Affect/Behavior, Orientation Assessment, Arterial Line Site.  Depression ([id number]):  Resolved on 1/5/2016 at 60 years.  Comments:  1/5/2016 CST 4:28 CST - SYSTEM  Problem automatically updated  based on documentation on Capillary Refills, Brachial Pulses, Radial Pulses, Femoral Pulses, Dorsalis Pulses, Ulnar pulses, Popliteal Pulses, Postibial Pulses, Edemas, Affect/Behavior, Orientation Assessment, Arterial Line Site.  CVA (cerebral vascular accident) ([id number]):  Resolved on 2016 at 60 years.  Comments:  2016 CST 4:28 CST - SYSTEM  Problem automatically updated based on documentation on Capillary Refills, Brachial Pulses, Radial Pulses, Femoral Pulses, Dorsalis Pulses, Ulnar pulses, Popliteal Pulses, Postibial Pulses, Edemas, Affect/Behavior, Orientation Assessment, Arterial Line Site.  CS -  section ([id number]):  Resolved on 2016 at 60 years.  Comments:  2016 CST 4:28 CST - SYSTEM  Problem automatically updated based on documentation on Capillary Refills, Brachial Pulses, Radial Pulses, Femoral Pulses, Dorsalis Pulses, Ulnar pulses, Popliteal Pulses, Postibial Pulses, Edemas, Affect/Behavior, Orientation Assessment, Arterial Line Site.  Cough ([id number]):  Resolved on 2016 at 60 years.  Comments:  2016 CST 4:28 CST - SYSTEM  Problem automatically updated based on documentation on Capillary Refills, Brachial Pulses, Radial Pulses, Femoral Pulses, Dorsalis Pulses, Ulnar pulses, Popliteal Pulses, Postibial Pulses, Edemas, Affect/Behavior, Orientation Assessment, Arterial Line Site.  COPD (chronic obstructive pulmonary disease) (496):  Resolved on 2016 at 60 years.  Comments:  2016 CST 4:28 CST - SYSTEM  Problem automatically updated based on documentation on Capillary Refills, Brachial Pulses, Radial Pulses, Femoral Pulses, Dorsalis Pulses, Ulnar pulses, Popliteal Pulses, Postibial Pulses, Edemas, Affect/Behavior, Orientation Assessment, Arterial Line Site.  Cervical cancer (180.9):  Resolved on 2016 at 60 years.  Comments:  2016 CST 4:28 CST - SYSTEM  Problem automatically updated based on documentation  on Capillary Refills, Brachial Pulses, Radial Pulses, Femoral Pulses, Dorsalis Pulses, Ulnar pulses, Popliteal Pulses, Postibial Pulses, Edemas, Affect/Behavior, Orientation Assessment, Arterial Line Site.  Bone fusion ([id number]):  Resolved on 1/5/2016 at 60 years.  Comments:  1/5/2016 CST 4:28 CST - SYSTEM  Problem automatically updated based on documentation on Capillary Refills, Brachial Pulses, Radial Pulses, Femoral Pulses, Dorsalis Pulses, Ulnar pulses, Popliteal Pulses, Postibial Pulses, Edemas, Affect/Behavior, Orientation Assessment, Arterial Line Site.  Anxiety ([id number]):  Resolved on 1/5/2016 at 60 years.  Comments:  1/5/2016 CST 4:28 CST - SYSTEM  Problem automatically updated based on documentation on Capillary Refills, Brachial Pulses, Radial Pulses, Femoral Pulses, Dorsalis Pulses, Ulnar pulses, Popliteal Pulses, Postibial Pulses, Edemas, Affect/Behavior, Orientation Assessment, Arterial Line Site.   Family History:    Diabetes mellitus type 2  Mother  Acute myocardial infarction.  Father  Hypertension.  Mother  Peripheral vascular disease.  Mother     Procedure history:    Incision & Drainage Major (.) performed by Louise SALCEDO, Enrico CHRISTINA on 11/11/2015 at 59 Years.  Comments:  11/11/2015 13:44 - Adis Oscar RN  auto-populated from documented surgical case  Vagotomy performed by Dewey Wyatt MD on 11/12/2013 at 57 Years.  Comments:  11/12/2013 14:17 -   auto-populated from documented surgical case  Gastrectomy performed by Dewey Wyatt MD on 11/12/2013 at 57 Years.  Comments:  11/12/2013 14:17 -   auto-populated from documented surgical case  Esophagogastroduodenoscopy performed by JAYDEN Helms MD on 10/15/2013 at 57 Years.  Comments:  10/15/2013 11:04 - Layo Olivier RN  auto-populated from documented surgical case  Biopsy Gastrointestional performed by JAYDEN Helms MD on 10/15/2013 at 57 Years.  Comments:  10/15/2013 11:04 - Layo Olivier RN  A.  auto-populated from documented surgical case  Cervical discectomy (SNOMED CT 324788198) in  at 37 Years.  Cholecystectomy (SNOMED CT 65387369).   section (SNOMED CT 17000535).  Comments:  10/15/2013 06:56 - Joy ST, Guerrero CARPENTER  times 2   Social History        Social & Psychosocial Habits    Alcohol  10/15/2013 Risk Assessment: Denies Alcohol Use    2018  Use: Never    Employment/School  2013 Risk Assessment: Not employed or in school    Home/Environment  2013  Lives with: Siblings    Living situation: Home with assistance    Nutrition/Health  2013  Type of diet: Regular    Substance Abuse  10/15/2013 Risk Assessment: Denies Substance Abuse    2018  Use: Never    Tobacco  10/15/2013 Risk Assessment: High Risk    2013  Use: Current    Type: Cigarettes    Tobacco use per day: 20    Number of years: 42    Started at age: 16.0 Years  .        Physical Examination   General:  No acute distress, sleepy but awake.    Eye:  Pupils are equal, round and reactive to light, Extraocular movements are intact.    HENT:  Normal hearing, Oral mucosa is moist.    Neck:  Supple, Non-tender, No jugular venous distention.    Respiratory:  Respirations are non-labored, Symmetrical chest wall expansion, bilateral wheezing.    Cardiovascular:  Normal rate, Regular rhythm, No murmur, No gallop, Good pulses equal in all extremities.    Gastrointestinal:  Soft, Non-tender, Non-distended, Normal bowel sounds.       Vital Signs (last 24 hrs)_____  Last Charted___________  Temp Oral     36.8 DegC  (MAY 17 07:16)  Heart Rate Peripheral   73 bpm  (MAY 17 11:00)  Resp Rate         18 br/min  (MAY 17 07:00)  SBP      H 172mmHg  (MAY 17 11:00)  DBP      90 mmHg  (MAY 17 11:00)  SpO2      100 %  (MAY 17 11:00)  Weight      48.7 kg  (MAY 17 03:00)     Genitourinary:  No costovertebral angle tenderness, No inguinal tenderness.    Lymphatics:  No lymphadenopathy neck, axilla, groin.    Musculoskeletal:   Normal range of motion, Normal strength.    Integumentary:  Warm, Dry, Pink.    Neurologic:  Alert, Oriented, Normal motor function, No focal deficits.    Cognition and Speech:  Oriented, Speech clear and coherent.    Psychiatric:  Cooperative, Appropriate mood & affect.       Review / Management   Results review:     No qualifying data available.    Laboratory Results   Last 5 Days Lab Results : PowerNote Discrete Results   5/17/2018 5:10 CDT       WBC                       10.9 x10(3)/mcL                             RBC                       3.53 x10(6)/mcL  LOW                             Hgb                       8.8 gm/dL  LOW                             Hct                       28.2 %  LOW                             Platelet                  142 x10(3)/mcL                             MCV                       80.0 fL                             MCH                       24.8 pg  LOW                             MCHC                      31.0 gm/dL  LOW                             RDW                       20.8 %  HI                             MPV                       10.4 fL                             Abs Neut                  9.40 x10(3)/mcL  HI                             Neutro Auto               86 %  HI                             Lymph Auto                6 %  LOW                             Mono Auto                 5 %                             Eos Auto                  2 %  NA                             Abs Eos                   0.2 x10(3)/mcL  NA                             Basophil Auto             1 %                             Abs Neutro                9.40 x10(3)/mcL  HI                             Abs Lymph                 0.6 x10(3)/mcL  NA                             Abs Mono                  0.6 x10(3)/mcL  NA                             Abs Baso                  0.1 x10(3)/mcL  NA                             Platelet Est              Normal                             Anisocyte                  1+                             Polychrom                 1+                             RBC Morph                 Abnormal    5/16/2018 11:20 CDT      UA Appear                 Clear                             UA Color                  Yellow                             UA Spec Grav              1.015                             UA Bili                   Negative                             UA pH                     6.0                             UA Urobilinogen           0.2                             UA Blood                  Trace-intact                             UA Glucose                Negative                             UA Ketones                Negative                             UA Protein                Negative                             UA Nitrite                Negative                             UA Leuk Est               Negative                             UA WBC                    None Seen                             UA RBC                    None Seen                             UA Bacteria               None Seen                             UA Squam Epithelial       None Seen                             U pH                      6.0                             U Amph Scr                NEG.                             U Samantha Scr                NEG.                             U Benzodia Scr            NEG.                             U Cannab Scr              NEG.                             U Cocaine Scr             NEG.                             U Opiate Scr              NEG.                             U Phencyclidine Scr       NEG.    5/16/2018 11:10 CDT      WBC                       6.7 x10(3)/mcL                             RBC                       2.60 x10(6)/mcL  LOW                             Hgb                       6.0 gm/dL  LOW                             Hct                       20.9 %  LOW                             Platelet                  155 x10(3)/mcL                              MCV                       80.1 fL                             MCH                       23.2 pg  LOW                             MCHC                      29.0 gm/dL  LOW                             RDW                       22.7 %  HI                             MPV                       11.2 fL  HI                             Abs Neut                  3.50 x10(3)/mcL                             Neutro Auto               53 %                             Lymph Auto                33 %                             Mono Auto                 9 %                             Eos Auto                  4 %  NA                             Abs Eos                   0.3 x10(3)/mcL  NA                             Basophil Auto             1 %                             Abs Neutro                3.50 x10(3)/mcL                             Abs Lymph                 2.2 x10(3)/mcL  NA                             Abs Mono                  0.6 x10(3)/mcL  NA                             Abs Baso                  0.1 x10(3)/mcL  NA                             Sodium Lvl                149 mmol/L  HI                             Potassium Lvl             3.7 mmol/L                             Chloride                  113 mmol/L  HI                             CO2                       29.4 mmol/L                             Calcium Lvl               7.6 mg/dL  LOW                             Magnesium Lvl             2.2 mg/dL                             Glucose Lvl               90 mg/dL                             BUN                       29.8 mg/dL  HI                             Creatinine                0.90 mg/dL                             eGFR-AA                   >60 mL/min/1.73 m2  NA                             eGFR-MAURICE                  >60 mL/min/1.73 m2  NA                             Bili Total                0.3 mg/dL                             Bili Direct               0.06 mg/dL                              Bili Indirect             0.24 mg/dL                             AST                       21 unit/L                             ALT                       18 unit/L                             Alk Phos                  74 unit/L                             Total Protein             5.6 gm/dL  LOW                             Albumin Lvl               2.30 gm/dL  LOW                             Globulin                  3.30 gm/dL                             A/G Ratio                 0.7 ratio  LOW                             Iron Lvl                  12 mcg/dL  LOW                             Transferrin               307.0 mg/dL                             TIBC                      498 mcg/dL  HI                             Iron Sat                  2.4 %  LOW                             TSH                       2.505 mIU/mL                             Phenytoin Total           <0.5 mcg/mL  LOW        Diagnostic Findings:  * Final Report *    Reason For Exam  Altered level of Consciousness    Radiology Report     CLINICAL: Altered level of consciousness.     TECHNIQUE: Sequential axial images were performed of the brain without  contrast.      Dose product length of 1104 mGycm. Automated exposure control was  utilized to minimize radiation dose.     COMPARISON: April 12, 2018.      FINDINGS:  There is no intracranial mass effect, midline shift,  hydrocephalus or hemorrhage. There is no sulcal effacement or low  attenuation changes to suggest recent large vessel territory  infarction.  Chronic appearing periventricular and subcortical white  matter low attenuation changes are present and are consistent with  chronic microangiopathic ischemia. The ventricular system and sulcal  markings prominence is consistent with atrophy. There is no acute  extra axial fluid collection. Visualized paranasal sinuses are clear  without mucosal thickening, polypoidal abnormality or air-fluid  levels. There is  unchanged under pneumatization of left mastoid air  cells with opacifications.      IMPRESSION:     No acute intracranial findings identified.       Signature Line  Electronically Signed By: Jefferson English MD  Date/Time Signed: 05/16/2018 13:12    Technical Comments  Home Medication Reviewed? No        This document has an image    Result type: CT Head W/O Contrast  Result date: May 16, 2018 13:05 CDT  Result status: Auth (Verified)  Result title: CT Head W/O Contrast  Performed by: Jefferson English MD on May 16, 2018 13:10 CDT  Verified by: Jefferson English MD on May 16, 2018 13:12 CDT  Encounter info: [id number], [facility] Acute, Emergency, 5/16/2018 - 5/16/2018       * Final Report *    Reason For Exam  abdominal pain;Abdominal Pain    Radiology Report  INDICATION: abdominal pain;Abdominal Pain  COMPARISON:  Abdominopelvic CT 2/13/2018     TECHNIQUE:   CT of the abdomen and pelvis WITHOUT intravenous contrast. The abdomen  and pelvis were scanned utilizing a multidetector helical scanner from  the diaphragm to the lesser trochanter without the administration of  intravenous or oral contrast. Coronal and sagittal reformations were  obtained.  Automatic exposure control was utilized to limit radiation dose.     Radiation Dose:  Total DLP: 227 mGy*cm     DISCUSSION:  Lack of intravenous contrast limits sensitivity for detection of solid  organ and vascular pathology.     LOWER THORAX: Right basilar pleural parenchymal scarring. Bibasilar  atelectasis/scarring. Otherwise lung bases clear. No pleural  effusions. Trace pericardial effusion.     HEPATOBILIARY: The liver is mildly enlarged, measures 16.7 cm in the  right midclavicular line. No focal hepatic lesions. Mild biliary  ductal prominence may relate to reservoir effect status post  cholecystectomy.  SPLEEN: Spleen size within normal limits. No focal splenic lesions.     PANCREAS: No focal masses or ductal dilatation.  ADRENALS: Mild hypertrophy. No discrete  nodules.  KIDNEYS/URETERS: Left upper pole 3 mm nonobstructing stone versus  vascular calcification. No radiodense stones on the right. No  hydronephrosis. No perinephric inflammatory changes.     PELVIC ORGANS/BLADDER: Unremarkable.  PERITONEUM / RETROPERITONEUM: No free intraperitoneal air. No free  fluid.     LYMPH NODES: No lymphadenopathy.  VESSELS: Scattered atherosclerotic vascular calcifications of a  nonaneurysmal abdominal aorta and its iliac branches.     GI TRACT: Dilated loop of proximal small bowel in the left mid abdomen  to 3.9 cm. Large retained feces in the rectal vault. Otherwise no  distention or wall thickening. Normal appendix.     BONES AND SOFT TISSUES: Soft tissues within normal limits. Healed  right lower rib fractures. No bony destructive lesions. No acute bony  pathology.     IMPRESSION:      1. Dilated loops small bowel in the left mid abdomen may relate to  ileus, less likely early obstruction. No pneumatosis or free air.     2. Large retained feces in the rectal vault. Correlate for  constipation/impaction.       Signature Line  Electronically Signed By: Elias Esparza MD  Date/Time Signed: 02/22/2018 18:21    Technical Comments  History of Allergies? No   Did Patient have reaction? No        This document has an image    Result type: CT Abdomen and Pelvis W/O Contrast  Result date: February 22, 2018 18:12 CST  Result status: Auth (Verified)  Result title: CT Abdomen and Pelvis W/O Contrast  Performed by: Elias Esparza MD on February 22, 2018 18:18 CST  Verified by: Elias Esparza MD on February 22, 2018 18:21 CST  Encounter info: [id number], [facility] Acute, Emergency, 2/22/2018 - 2/22/2018      .       Impression and Plan   Iron defiency Anemia: will try and set her up for IV iron infusion thru dr bonilla. Unfortunately, I cannot order it and will defer to dr bonilla. she has recieved 2 doses of ferrlecit so will need 6 more or an equivalnet thereof.  IF she does  not recieve she will need oral therapy with monitoring.    Recurrent admits for Nonresponsiveness. I would DC narcotics. Significant other states she takes other peopled meds.  doubtul this is postictal seizures. I luis a with NH placement.   COPD/Tobacco Abuse: continue home meds. STABLE  Hx of seizures: continue meds. appears stable.   time spent in DC process 35 minutes

## 2022-04-30 NOTE — DISCHARGE SUMMARY
Patient:   Soumya Castellon            MRN: [id number]            FIN: [id number]               Age:   62 years     Sex:  Female     :  1955   Associated Diagnoses:   None   Author:   Finn Grimes MD      Chief Complaint      History of Present Illness   The patient has a hx of recurrent admitssion for lethargy and comatose findings presumable due to overuse of medicatons.  pt arrived in [facility] as previously. last admit was 2017 and she had to be intubated. symptoms lasted for approximately 5 days. pt does have a hx of seizures but dilantin leves were low and likely more consistent with noncompliance.  CT head was negative. on eval yesterday she had pinpoint pupils and GCS was 3; today she is moving in bed, she does respond to pain, makes noncomprehensible sounds but does not  oper eyes. pupils are mid range and reactive. seems to be moving all extremitites.      CC: Confusion: pt much more alert but remains very confused. she tells me she did cocaine but her drug screen was negative. she is still moving all extremities. she tells me she does not take her dilantin. she cannot tell me if she has been having seizures; she is not oriented to place, time or situation. Her ammonia level was normal      Review of Systems   Constitutional      Health Status   Current medications:  (Selected)   Inpatient Medications  Ordered  IVF D5 ½ Normal Saline w/ 20 mEq KCl Infusion 1,000 mL: 1,000 mL, 1,000 mL, IV, 75 mL/hr, start date 18 10:24:00 CST  Normal Saline (0.9% NS) IV: 1,000 mL, 1,000 mL, IV, 1000 mL/hr, start date 17 23:18:00 CDT, stop date 17 23:18:00 CDT, STAT  phenytoin: 250 mg, form: Injection, IV, q12hr, Infuse over: 0.5 hr, first dose 18 12:00:00 CST, Waste Code BK  Pending Complete  Influenza Virus Vaccine, Inactivated: 0.5 mL, form: Susp, IM, Daily, Order duration: 1 dose(s), first dose 18 9:00:00 CST, stop date 18 8:59:00  CST  Prescriptions  Prescribed  Dilantin 100 mg oral capsule, extended release: 200 mg = 2 cap(s), Oral, Once a day (at bedtime), # 60 cap(s), 0 Refill(s), Pharmacy: [facility] [id number]  Dilantin 100 mg oral capsule, extended release: 300 mg = 3 cap(s), Oral, Once a day (at bedtime), # 21 cap(s), 0 Refill(s), Pharmacy: [facility] [id number]  DuoNeb 0.5 mg-2.5 mg/3 mL inhalation solution: 3 mL, NEB, q4hr Resp, # 540 mL, 0 Refill(s), Pharmacy: [facility] [id number]  atorvastatin 10 mg oral tablet: 10 mg = 1 tab(s), Oral, Daily, # 30 tab(s), 0 Refill(s), [facility] [id number]  chlorzoxazone 500 mg oral tablet: 500 mg = 1 tab(s), Oral, TID, X 7 day(s), # 21 tab(s), 0 Refill(s), Pharmacy: [facility] [id number]  Documented Medications  Documented  Colace 100 mg oral capsule: 100 mg = 1 cap(s), Oral, BID, 0 Refill(s)  DECARA 11674 IU D3 (MARK) CAPSULES: Oral, qWeek  LYRICA 100 MG CAPSULE: 100 mg = 1 cap(s), Oral, TID  METOCLOPRAMIDE 10 MG TABLET: 10 mg = 1 tab(s), Oral, AC TID  MIRTAZAPINE 15 MG TABLET: 15 mg = 1 tab(s), Oral, qPM  RANITIDINE 300 MG TABLET: 300 mg = 1 tab(s), Oral, qPM  TRAMADOL 50MG TABLETS:   Tylenol with Codeine #3 oral tablet: 1 tablet, Oral, BID, PRN PRN pain, 0 Refill(s)  busPIRone 15 mg oral tablet: 1 tablet, Oral, BID, 0 Refill(s)  hydrochlorothiazide 25 mg oral tablet: 1 tablet, Oral, Daily, 0 Refill(s)  venlafaxine 150 mg oral capsule, extended release: 2 cap, Oral, Daily, 0 Refill(s)      Physical Examination   General:  No acute distress, pt had gown off. speaking in sentences but notheing makes sense. GCS: 14, Not alert and oriented.    Eye:  Extraocular movements are intact.    HENT:  Normocephalic, Tympanic membranes are clear.    Neck:  Supple, Non-tender, No carotid bruit.    Respiratory:  Lungs are clear to auscultation, Respirations are non-labored, Breath sounds are equal.    Cardiovascular:  Normal rate, Regular rhythm, No murmur.     Gastrointestinal:  Soft, Non-tender, Non-distended.       Vital Signs (last 24 hrs)_____  Last Charted___________  Temp Oral     37.0 DegC  (FEB 07 08:00)  Heart Rate Peripheral   86 bpm  (FEB 07 08:00)  Resp Rate         20 br/min  (FEB 07 08:00)  SBP      H 155mmHg  (FEB 07 08:00)  DBP      90 mmHg  (FEB 07 08:00)  SpO2      99 %  (FEB 07 08:00)  Weight      39.0 kg  (FEB 07 04:00)     Integumentary:  Warm, Dry, Pink.       Review / Management   Results review:     Labs (Last four charted values)  Glucose              103 (FEB 07) .    Laboratory Results   Last 5 Days Lab Results : PowerNote Discrete Results   2/7/2018 5:59 CST        WBC                       11.0 x10(3)/mcL                             RBC                       4.80 x10(6)/mcL                             Hgb                       10.6 gm/dL  LOW                             Hct                       34.7 %  LOW                             Platelet                  352 x10(3)/mcL                             MCV                       72.3 fL  LOW                             MCH                       22.0 pg  LOW                             MCHC                      30.5 gm/dL  LOW                             RDW                       19.9 %  HI                             MPV                       9.5 fL                             Abs Neut                  8.80 x10(3)/mcL                             Neutro Auto               80 %                             Lymph Auto                13 %                             Mono Auto                 7 %                             Eos Auto                  0 %  NA                             Basophil Auto             1 %                             Abs Neutro                8.80 x10(3)/mcL                             Abs Lymph                 1.4 x10(3)/mcL  NA                             Abs Mono                  0.7 x10(3)/mcL  NA                             Abs Baso                  0.1 x10(3)/mcL  NA                              Sodium Lvl                150 mmol/L  HI                             Potassium Lvl             2.8 mmol/L  LOW                             Chloride                  107 mmol/L                             CO2                       30.1 mmol/L                             Calcium Lvl               9.2 mg/dL                             Glucose Lvl               103 mg/dL                             BUN                       13.4 mg/dL                             Creatinine                0.80 mg/dL                             eGFR-AA                   >60 mL/min/1.73 m2  NA                             eGFR-MAURICE                  >60 mL/min/1.73 m2  NA                             Bili Total                0.6 mg/dL                             Bili Direct               0.30 mg/dL  HI                             Bili Indirect             0.31 mg/dL                             AST                       20 unit/L                             ALT                       38 unit/L                             Alk Phos                  129 unit/L  HI                             Total Protein             6.9 gm/dL                             Albumin Lvl               3.10 gm/dL  LOW                             Globulin                  3.80 gm/dL  HI                             A/G Ratio                 0.8 ratio  LOW                             Ammonia Lvl               <14.0 mcg/dL    2/6/2018 9:58 CST        Phenytoin Total           0.4 mcg/mL  LOW           Impression and Plan   Comatose: slighlty improved. I dont think this is any different than prior. will check ammonia level as it was elevated in past for no clear reason. I suspect the issue is her meds or post ictal state since its apparent she has not been taking her medicaiton for seizures. Breathing remains stable. will continue to monitor.  She is moving all her extremitites and pupils  are equal and reactive.  Additionally, pt is on multiple  sedative meds such as trazodone, baclofen, remeron, lyrica and tramadol; 2/7: pt doing much better but remains confused at this time. probably post ictal state. will see if we can clear her for oral intake and resume meds once able to do so. Otherwise will keep NPO  Hypernatremia/hypokalemia: due to inadequate intake; start D51/2NS with potassium; repeat labs in AM;  COPD: nebs prn; no signficiant breathing issues.  Hx HTN: controlled at this time; will give prn meds if needed.  restart HCTZ once able to tolerate oral intake  Hx Duodenal Ulcer; appears stable.  DVT Prophylaxis: cont SCD'S  pt is full code  unclear surrogate decision maker  Transfer to acute Care; pt needs 2-3 days of inpatient treatment due to seizure, hypernatremia, confusion.  time spent in DC process 26 minutes

## 2022-04-30 NOTE — H&P
Patient:   Soumya Castellon            MRN: [id number]            FIN: [id number]               Age:   62 years     Sex:  Female     :  1955   Associated Diagnoses:   None   Author:   Cornelio SALCEDO, Finn      Chief Complaint   2018 9:16 CST        found unresponsive at home by family, reports last seeing her asleep last pm, lethargic with arousal to sternal rub, norcan 1mg iv given enroute, cbg 63 enroute, hx overmedication of home meds        History of Present Illness   The patient has a hx of recurrent admitssion for lethargy and comatose findings presumable due to overuse of medicatons.  pt arrived in [facility] as previously. last admit was 2017 and she had to be intubated. symptoms lasted for approximately 5 days. pt does have a hx of seizures but dilantin leves were low and likely more consistent with noncompliance.  CT head was negative. on eval yesterday she had pinpoint pupils and GCS was 3; today she is moving in bed, she does respond to pain, makes noncomprehensible sounds but does not  oper eyes. pupils are mid range and reactive. seems to be moving all extremitites.       Review of Systems   Unable to obtain due to comatose condition.      Health Status   Current medications:  (Selected)   Inpatient Medications  Ordered  Influenza Virus Vaccine, Inactivated: 0.5 mL, form: Susp, IM, Daily, Order duration: 1 dose(s), first dose 18 9:00:00 CST, stop date 18 8:59:00 CST  Normal Saline (0.9% NS) IV: 1,000 mL, 1,000 mL, IV, 1000 mL/hr, start date 17 23:18:00 CDT, stop date 17 23:18:00 CDT, STAT  phenytoin: 250 mg, form: Injection, IV, q12hr, Infuse over: 0.5 hr, first dose 18 12:00:00 CST, Waste Code [facility]  Prescriptions  Prescribed  Dilantin 100 mg oral capsule, extended release: 200 mg = 2 cap(s), Oral, Once a day (at bedtime), # 60 cap(s), 0 Refill(s), Pharmacy: [facility] [id number]  Dilantin 100 mg oral capsule, extended release: 300 mg = 3  cap(s), Oral, Once a day (at bedtime), # 21 cap(s), 0 Refill(s), Pharmacy: [facility] [id number]  DuoNeb 0.5 mg-2.5 mg/3 mL inhalation solution: 3 mL, NEB, q4hr Resp, # 540 mL, 0 Refill(s), Pharmacy: [facility] [id number]  atorvastatin 10 mg oral tablet: 10 mg = 1 tab(s), Oral, Daily, # 30 tab(s), 0 Refill(s), Pharmacy: [facility] [id number]  chlorzoxazone 500 mg oral tablet: 500 mg = 1 tab(s), Oral, TID, X 7 day(s), # 21 tab(s), 0 Refill(s), Pharmacy: [facility] [place] [facility] [id number]  Documented Medications  Documented  Colace 100 mg oral capsule: 100 mg = 1 cap(s), Oral, BID, 0 Refill(s)  DECARA 82831 IU D3 (MARK) CAPSULES: Oral, qWeek  LYRICA 100 MG CAPSULE: 100 mg = 1 cap(s), Oral, TID  METOCLOPRAMIDE 10 MG TABLET: 10 mg = 1 tab(s), Oral, AC TID  MIRTAZAPINE 15 MG TABLET: 15 mg = 1 tab(s), Oral, qPM  RANITIDINE 300 MG TABLET: 300 mg = 1 tab(s), Oral, qPM  TRAMADOL 50MG TABLETS:   Tylenol with Codeine #3 oral tablet: 1 tablet, Oral, BID, PRN PRN pain, 0 Refill(s)  busPIRone 15 mg oral tablet: 1 tablet, Oral, BID, 0 Refill(s)  hydrochlorothiazide 25 mg oral tablet: 1 tablet, Oral, Daily, 0 Refill(s)  venlafaxine 150 mg oral capsule, extended release: 2 cap, Oral, Daily, 0 Refill(s)      Histories   Past Medical History:    Active  HYPERTENSION (997.91)  Chronic pain (338.2)  Resolved  Seizure disorder (345.90):  Resolved on 1/5/2016 at 60 years.  Comments:  1/5/2016 CST 4:28 CST - SYSTEM  Problem automatically updated based on documentation on Capillary Refills, Brachial Pulses, Radial Pulses, Femoral Pulses, Dorsalis Pulses, Ulnar pulses, Popliteal Pulses, Postibial Pulses, Edemas, Affect/Behavior, Orientation Assessment, Arterial Line Site.  OA (osteoarthritis) ([id number]):  Resolved on 1/5/2016 at 60 years.  Comments:  1/5/2016 CST 4:28 CST - SYSTEM  Problem automatically updated based on documentation on Capillary Refills, Brachial Pulses, Radial Pulses, Femoral Pulses,  Dorsalis Pulses, Ulnar pulses, Popliteal Pulses, Postibial Pulses, Edemas, Affect/Behavior, Orientation Assessment, Arterial Line Site.  HLD (hyperlipidemia) ([id number]):  Resolved on 1/5/2016 at 60 years.  Comments:  1/5/2016 CST 4:28 CST - SYSTEM  Problem automatically updated based on documentation on Capillary Refills, Brachial Pulses, Radial Pulses, Femoral Pulses, Dorsalis Pulses, Ulnar pulses, Popliteal Pulses, Postibial Pulses, Edemas, Affect/Behavior, Orientation Assessment, Arterial Line Site.  History of bleeding ulcers (V12.71):  Resolved on 1/5/2016 at 60 years.  Comments:  1/5/2016 CST 4:28 CST - SYSTEM  Problem automatically updated based on documentation on Capillary Refills, Brachial Pulses, Radial Pulses, Femoral Pulses, Dorsalis Pulses, Ulnar pulses, Popliteal Pulses, Postibial Pulses, Edemas, Affect/Behavior, Orientation Assessment, Arterial Line Site.  Duodenal ulcer ([id number]):  Resolved on 1/5/2016 at 60 years.  Comments:  1/5/2016 CST 4:28 CST - SYSTEM  Problem automatically updated based on documentation on Capillary Refills, Brachial Pulses, Radial Pulses, Femoral Pulses, Dorsalis Pulses, Ulnar pulses, Popliteal Pulses, Postibial Pulses, Edemas, Affect/Behavior, Orientation Assessment, Arterial Line Site.  DJD (degenerative joint disease) ([id number]):  Resolved on 1/5/2016 at 60 years.  Comments:  1/5/2016 CST 4:28 CST - SYSTEM  Problem automatically updated based on documentation on Capillary Refills, Brachial Pulses, Radial Pulses, Femoral Pulses, Dorsalis Pulses, Ulnar pulses, Popliteal Pulses, Postibial Pulses, Edemas, Affect/Behavior, Orientation Assessment, Arterial Line Site.  Depression ([id number]):  Resolved on 1/5/2016 at 60 years.  Comments:  1/5/2016 CST 4:28 CST - SYSTEM  Problem automatically updated based on documentation on Capillary Refills, Brachial Pulses, Radial Pulses, Femoral Pulses,  Dorsalis Pulses, Ulnar pulses, Popliteal Pulses, Postibial Pulses, Edemas, Affect/Behavior, Orientation Assessment, Arterial Line Site.  CVA (cerebral vascular accident) ([id number]):  Resolved on 2016 at 60 years.  Comments:  2016 CST 4:28 CST - SYSTEM  Problem automatically updated based on documentation on Capillary Refills, Brachial Pulses, Radial Pulses, Femoral Pulses, Dorsalis Pulses, Ulnar pulses, Popliteal Pulses, Postibial Pulses, Edemas, Affect/Behavior, Orientation Assessment, Arterial Line Site.  CS -  section ([id number]):  Resolved on 2016 at 60 years.  Comments:  2016 CST 4:28 CST - SYSTEM  Problem automatically updated based on documentation on Capillary Refills, Brachial Pulses, Radial Pulses, Femoral Pulses, Dorsalis Pulses, Ulnar pulses, Popliteal Pulses, Postibial Pulses, Edemas, Affect/Behavior, Orientation Assessment, Arterial Line Site.  Cough ([id number]):  Resolved on 2016 at 60 years.  Comments:  2016 CST 4:28 CST - SYSTEM  Problem automatically updated based on documentation on Capillary Refills, Brachial Pulses, Radial Pulses, Femoral Pulses, Dorsalis Pulses, Ulnar pulses, Popliteal Pulses, Postibial Pulses, Edemas, Affect/Behavior, Orientation Assessment, Arterial Line Site.  COPD (chronic obstructive pulmonary disease) (496):  Resolved on 2016 at 60 years.  Comments:  2016 CST 4:28 CST - SYSTEM  Problem automatically updated based on documentation on Capillary Refills, Brachial Pulses, Radial Pulses, Femoral Pulses, Dorsalis Pulses, Ulnar pulses, Popliteal Pulses, Postibial Pulses, Edemas, Affect/Behavior, Orientation Assessment, Arterial Line Site.  Cervical cancer (180.9):  Resolved on 2016 at 60 years.  Comments:  2016 CST 4:28 CST - SYSTEM  Problem automatically updated based on documentation on Capillary Refills, Brachial Pulses, Radial Pulses, Femoral Pulses, Dorsalis Pulses, Ulnar  pulses, Popliteal Pulses, Postibial Pulses, Edemas, Affect/Behavior, Orientation Assessment, Arterial Line Site.  Bone fusion ([id number]):  Resolved on 1/5/2016 at 60 years.  Comments:  1/5/2016 CST 4:28 CST - SYSTEM  Problem automatically updated based on documentation on Capillary Refills, Brachial Pulses, Radial Pulses, Femoral Pulses, Dorsalis Pulses, Ulnar pulses, Popliteal Pulses, Postibial Pulses, Edemas, Affect/Behavior, Orientation Assessment, Arterial Line Site.  Anxiety ([id number]):  Resolved on 1/5/2016 at 60 years.  Comments:  1/5/2016 CST 4:28 CST - SYSTEM  Problem automatically updated based on documentation on Capillary Refills, Brachial Pulses, Radial Pulses, Femoral Pulses, Dorsalis Pulses, Ulnar pulses, Popliteal Pulses, Postibial Pulses, Edemas, Affect/Behavior, Orientation Assessment, Arterial Line Site.   Family History:    Diabetes mellitus type 2  Mother  Acute myocardial infarction.  Father  Hypertension.  Mother  Peripheral vascular disease.  Mother     Procedure history:    Incision & Drainage Major (.) performed by Enrico Gil MD on 11/11/2015 at 59 Years.  Comments:  11/11/2015 13:44 - Adis Oscar RN  auto-populated from documented surgical case  Vagotomy performed by Dewey Wyatt MD on 11/12/2013 at 57 Years.  Comments:  11/12/2013 14:17 -   auto-populated from documented surgical case  Gastrectomy performed by Dewey Wyatt MD on 11/12/2013 at 57 Years.  Comments:  11/12/2013 14:17 -   auto-populated from documented surgical case  Esophagogastroduodenoscopy performed by JAYDEN Helms MD on 10/15/2013 at 57 Years.  Comments:  10/15/2013 11:04 - Layo Olivier RN  auto-populated from documented surgical case  Biopsy Gastrointestional performed by JAYDEN Helms MD on 10/15/2013 at 57 Years.  Comments:  10/15/2013 11:04 - Layo Olivier RN  auto-populated from documented surgical case  Cervical discectomy (SNOMED CT 538633098) in 1992 at 37  Years.  Cholecystectomy (SNOMED CT 90989909).   section (SNOMED CT 02408855).  Comments:  10/15/2013 06:56 - Joy ST, [facility]  times 2   Social History        Social & Psychosocial Habits    Alcohol  10/15/2013 Risk Assessment: Denies Alcohol Use    2018  Use: Never    Employment/School  2013 Risk Assessment: Not employed or in school    Home/Environment  2013  Lives with: Siblings    Living situation: Home with assistance    Nutrition/Health  2013  Type of diet: Regular    Substance Abuse  10/15/2013 Risk Assessment: Denies Substance Abuse    2018  Use: Never    Tobacco  10/15/2013 Risk Assessment: High Risk    2013  Use: Current    Type: Cigarettes    Tobacco use per day: 20    Number of years: 42    Started at age: 16.0 Years  .        Physical Examination   General:  Alert and oriented, No acute distress.    Eye:  Extraocular movements are intact.    HENT:  Normocephalic, Tympanic membranes are clear.    Neck:  Supple, Non-tender, No carotid bruit.    Respiratory:  Lungs are clear to auscultation, Respirations are non-labored, Breath sounds are equal.    Cardiovascular:  Normal rate, Regular rhythm, No murmur.    Gastrointestinal:  Soft, Non-tender, Non-distended.       Vital Signs (last 24 hrs)_____  Last Charted___________  Temp Oral     37.2 DegC  ( 07:36)  Heart Rate Peripheral   H 102bpm  ( 07:36)  Resp Rate         14 br/min  ( 00:00)  SBP      H 178mmHg  ( 07:36)  DBP      81 mmHg  ( 07:36)  SpO2      L 93%  ( 09:00)  Weight      39.9 kg  ( 04:00)     Genitourinary:  No costovertebral angle tenderness, No inguinal tenderness.    Lymphatics:  No lymphadenopathy neck, axilla, groin.    Musculoskeletal:  Normal range of motion, Normal strength.    Integumentary:  Warm, Dry, Pink.    Neurologic:  Alert, Oriented, Normal sensory.    Cognition and Speech:  Oriented, Speech clear and coherent.    Psychiatric:  Cooperative,  Appropriate mood & affect.       Review / Management   Results review:     No qualifying data available.    Laboratory Results   Last 5 Days Lab Results : PowerNote Discrete Results   2/6/2018 9:58 CST        Phenytoin Total           0.4 mcg/mL  LOW    2/6/2018 0:40 CST        BNP                       1,117.0 pg/mL  HI    2/5/2018 15:25 CST       WBC                       6.9 x10(3)/mcL                             RBC                       4.59 x10(6)/mcL                             Hgb                       10.0 gm/dL  LOW                             Hct                       34.4 %  LOW                             Platelet                  315 x10(3)/mcL                             MCV                       74.9 fL  LOW                             MCH                       21.8 pg  LOW                             MCHC                      29.0 gm/dL  LOW                             RDW                       20.1 %  HI                             MPV                       9.3 fL                             Abs Neut                  4.90 x10(3)/mcL                             Neutro Auto               70 %                             Lymph Auto                21 %                             Mono Auto                 6 %                             Eos Auto                  2 %  NA                             Abs Eos                   0.1 x10(3)/mcL  NA                             Basophil Auto             2 %                             Abs Neutro                4.90 x10(3)/mcL                             Abs Lymph                 1.4 x10(3)/mcL  NA                             Abs Mono                  0.4 x10(3)/mcL  NA                             Abs Baso                  0.1 x10(3)/mcL  NA                             Sodium Lvl                143 mmol/L                             Potassium Lvl             3.3 mmol/L  LOW                             Chloride                  107 mmol/L                              CO2                       26.8 mmol/L                             Calcium Lvl               9.3 mg/dL                             Glucose Lvl               94 mg/dL                             BUN                       22.3 mg/dL  HI                             Creatinine                0.98 mg/dL                             eGFR-AA                   >60 mL/min/1.73 m2  NA                             eGFR-MAURICE                  >60 mL/min/1.73 m2  NA                             Bili Total                0.3 mg/dL                             Bili Direct               0.11 mg/dL                             Bili Indirect             0.20 mg/dL                             AST                       14 unit/L  LOW                             ALT                       14 unit/L                             Alk Phos                  110 unit/L                             Total Protein             6.9 gm/dL                             Albumin Lvl               3.10 gm/dL  LOW                             Globulin                  3.80 gm/dL  HI                             A/G Ratio                 0.8 ratio  LOW                             Total CK                  108 unit/L    2/5/2018 10:58 CST       Sample ABG                arterial                             Treatment                 roomair                             Site                      Brachial Rt                             pH Art [facility]                7.320  LOW                             pCO2 Art [facility]              44.9 mmHg                             pO2 Art [facility]               72.2 mmHg  LOW                             HCO3 Art [facility]              23.1 mmol/L                             CO2 Tot Art [facility]           24.5 mmol/L                             O2 Sat Art [facility]            92.8 %  LOW                             Dbase Art [facility]             -3.0  LOW    2/5/2018 10:30 CST       U pH                      5.5                             U Amph Scr                 NEG.                             U Samantha Scr                NEG.                             U Benzodia Scr            NEG.                             U Cannab Scr              NEG.                             U Cocaine Scr             NEG.                             U Opiate Scr              NEG.                             U Phencyclidine Scr       NEG.    2/5/2018 9:46 CST        WBC                       5.9 x10(3)/mcL                             RBC                       4.86 x10(6)/mcL                             Hgb                       10.8 gm/dL  LOW                             Hct                       36.2 %  LOW                             Platelet                  355 x10(3)/mcL                             MCV                       74.6 fL  LOW                             MCH                       22.2 pg  LOW                             MCHC                      29.8 gm/dL  LOW                             RDW                       19.6 %  HI                             MPV                       9.3 fL                             Abs Neut                  3.00 x10(3)/mcL                             Neutro Auto               50 %                             Lymph Auto                36 %                             Mono Auto                 6 %                             Eos Auto                  5 %  NA                             Abs Eos                   0.3 x10(3)/mcL  NA                             Basophil Auto             4 %  HI                             Abs Neutro                3.00 x10(3)/mcL                             Abs Lymph                 2.1 x10(3)/mcL  NA                             Abs Mono                  0.4 x10(3)/mcL  NA                             Abs Baso                  0.2 x10(3)/mcL  NA                             Hypochrom                 1+                             Platelet Est              Adequate                             Anisocyte                 2+                              Poik                      1+                             Microcyte                 Slight                             Sodium Lvl                143 mmol/L                             Potassium Lvl             3.4 mmol/L  LOW                             Chloride                  107 mmol/L                             CO2                       27.6 mmol/L                             Calcium Lvl               9.2 mg/dL                             Glucose Lvl               79 mg/dL                             BUN                       25.8 mg/dL  HI                             Creatinine                1.11 mg/dL                             eGFR-AA                   >60 mL/min/1.73 m2  NA                             eGFR-MAURICE                  53 mL/min/1.73 m2  NA                             Bili Total                0.3 mg/dL                             Bili Direct               0.11 mg/dL                             Bili Indirect             0.19 mg/dL                             AST                       14 unit/L  LOW                             ALT                       15 unit/L                             Alk Phos                  114 unit/L                             Total Protein             7.1 gm/dL                             Albumin Lvl               3.20 gm/dL  LOW                             Globulin                  3.90 gm/dL  HI                             A/G Ratio                 0.8 ratio  LOW                             TSH                       0.460 mIU/mL                             Phenytoin Total           0.1 mcg/mL  LOW    2/4/2018 14:25 CST       WBC                       10.5 x10(3)/mcL                             RBC                       4.03 x10(6)/mcL  LOW                             Hgb                       9.2 gm/dL  LOW                             Hct                       30.0 %  LOW                             Platelet                  369 x10(3)/mcL                              MCV                       74.5 fL  LOW                             MCH                       22.9 pg  LOW                             MCHC                      30.7 gm/dL  LOW                             RDW                       20.0 %  HI                             MPV                       9.4 fL                             Abs Neut                  7.20 x10(3)/mcL                             Neutro Auto               68 %                             Lymph Auto                22 %                             Mono Auto                 6 %                             Eos Auto                  2 %  NA                             Abs Eos                   0.2 x10(3)/mcL  NA                             Basophil Auto             2 %                             Abs Neutro                7.20 x10(3)/mcL                             Abs Lymph                 2.3 x10(3)/mcL  NA                             Abs Mono                  0.6 x10(3)/mcL  NA                             Abs Baso                  0.2 x10(3)/mcL  NA                             Sodium Lvl                145 mmol/L                             Potassium Lvl             4.1 mmol/L                             Chloride                  107 mmol/L                             CO2                       26.6 mmol/L                             Calcium Lvl               8.9 mg/dL                             Glucose Lvl               163 mg/dL  HI                             BUN                       35.0 mg/dL  HI                             Creatinine                1.66 mg/dL  HI                             eGFR-AA                   40 mL/min/1.73 m2  NA                             eGFR-MAURICE                  33 mL/min/1.73 m2  NA                             Bili Total                0.9 mg/dL                             Bili Direct               0.11 mg/dL                             Bili Indirect             0.79 mg/dL                              AST                       18 unit/L                             ALT                       15 unit/L                             Alk Phos                  111 unit/L                             Total Protein             6.9 gm/dL                             Albumin Lvl               3.40 gm/dL                             Globulin                  3.50 gm/dL                             A/G Ratio                 1.0 ratio  LOW                             Phenytoin Total           0.1 mcg/mL  LOW        * Final Report *    Reason For Exam  Altered level of Consciousness    Radiology Report  Indication: Decreased level of consciousness, suspected drug overdose.     FINDINGS: Continuous axial images were performed through the levels of  the brain and skull base and are displayed in brain and bone window  settings. This study is compared to a prior CT dated 10/10/2017.     There is no evidence of acute hemorrhage, focal infarction or midline  shift. Ventricles and extra-axial fluid spaces appear normal for  patient age. There is stable mild decreased attenuation in the  periventricular white matter of the cerebral hemispheres consistent  with mild chronic white matter ischemia. Vascular calcification  involves the internal carotid arteries bilaterally at the level of the  skull base. Orbital contents appear unremarkable. The visualized  paranasal sinuses and mastoid air spaces are clear.     IMPRESSION: No acute intracranial abnormalities are appreciated.       Signature Line  Electronically Signed By: Aneesh Nolasco MD  Date/Time Signed: 02/05/2018 09:41    Technical Comments  Home Medication Reviewed? No        This document has an image    Result type: CT Head W/O Contrast  Result date: February 05, 2018 9:36 CST  Result status: Auth (Verified)  Result title: CT Head W/O Contrast  Performed by: Aneesh Nolasco MD on February 05, 2018 9:39 CST  Verified by: Aneesh Nolasco MD on February 05, 2018  9:41 CST  Encounter info: [id number], [facility], Emergency, 2/5/2018 - 2/5/2018     * Final Report *    Reason For Exam  ams;Other (please specify)    Radiology Report  Indication: Decreased level of consciousness     FINDINGS: Compared to 10/10/2017. Heart size is normal and lungs are  clear bilaterally. Pulmonary vasculature is normal. Endotracheal and  nasogastric tubes seen in the prior study have been removed. Old  fracture deformities are again seen in the right eighth through 10th  ribs.     IMPRESSION: No evidence of acute disease.       Signature Line  Electronically Signed By: Aneesh Nolasco MD  Date/Time Signed: 02/05/2018 09:42      This document has an image    Result type: XR Chest 1 View  Result date: February 05, 2018 9:36 CST  Result status: Auth (Verified)  Result title: XR Chest 1 View  Performed by: Aneesh Nolasco MD on February 05, 2018 9:41 CST  Verified by: Aneesh Nolasco MD on February 05, 2018 9:42 CST  Encounter info: [id number], [facility], Emergency, 2/5/2018 - 2/5/2018           Impression and Plan   Comatose: slighlty improved. I dont think this is any different than prior. will check ammonia level as it was elevated in past for no clear reason. I suspect the issue is her meds or post ictal state since its apparent she has not been taking her medicaiton for seizures. Breathing remains stable. will continue to monitor.  She is moving all her extremitites and pupils  are equal and reactive.  Additionally, pt is on multiple sedative meds such as trazodone, baclofen, remeron, lyrica and tramadol  COPD: nebs prn; no signficiant breathing issues.  Hx HTN: controlled at this time; will give prn meds if needed.  restart HCTZ once able to tolerate oral intake  Hx Duodenal Ulcer; appears stable.  DVT Prophylaxis: cont SCD'S  pt is full code  unclear surrogate decision maker

## 2022-05-04 NOTE — HISTORICAL [facility] CERNER
This is a historical note converted from Michelle. Formatting and pictures may have been removed.  Please reference Michelle for original formatting and attached multimedia. Chief Complaint  rt 5th toe ulcer  History of Present Illness  Mrs. Castellon presents today with the 3 month history of a nonhealing right fifth toe ulcer.? She presents now for further evaluation and treatment of this problem.  ?  She states that this began with a black dot appearance.? She tried to scrub the area with soap and water soaked towel.? The next day however she noticed increased pain and discoloration at the site and a resulting ulcer over the dorsum of the right fifth toe.? Since then she has continued to have difficulty healing this wound.? She is used multiple antibiotics and topical preparations.? She has also been seen and evaluated by Dr. Darryn Villeda, a local podiatrist.  Review of Systems  REVIEW OF SYSTEMS:  Constitutional:? [No fever, fatigue or weight loss.]?  Skin:? See HPI  Eyes:? [No recent vision problems or eye pain.]?  ENT:? [No congestion, ear pain, or sore throat.]?  Endocrine:? [No thyroid problems.]?  Cardiovascular:? [No chest pain.]?  Respiratory:? See PMH  Gastrointestinal:? See PMH  Genitourinary: [No dysuria.]?  Musculoskeletal:? [No joint swelling.]?  Neurologic:? See PMH  Hematologic:?See PMH  Psychiatric : See PMH - chronic nerve problem ; chronic cigarette?user  other systems reviewed and otherwise negative.]  ?  Physical Exam  Vitals & Measurements  T:?36.8? ?C (Oral)? HR:?80(Peripheral)? RR:?18? BP:?104/61?  HT:?160?cm? WT:?49.5?kg?  PHYSICAL EXAMINATION:  HEENT: Normal cephalic; PERRLA; Nose is unremarkable; Oropharyngeal exam is normal  NECK: Supple; without thyromegaly  RESPIRATORY: Rhonchi  Cardio : NSR  Abdomen : soft ; without organomegaly   : w/o CVAT  MS : good range of motion all joints  NEURO: Without focal sensory or motor deficit  LYMPHATIC: Without lymphadenopathy  PSYCH:  Unremarkable  SKIN:There is a 0.3 cm ulcer over the dorsal right fifth toe with a depth of approximately 0.2 cm.? Good granulation tissue is present at the base is no significant drainage.? There is also soft tissue swelling of the fifth toe.  Vascular :The patient has excellent flow by Doppler in both feet.  ?  Assessment/Plan  1.?Ulcer of right foot  ? The wound will be packed with Mesalt daily.? Additional diagnostic studies will include?a conventional x-rays of theright foot?to rule out osteomyelitis.? Appointment has been advised in 1 week.   Problem List/Past Medical History  Ongoing  Chronic disease-related malnutrition  Chronic pain  HYPERTENSION  OA (osteoarthritis)  Seizure disorder  Ulcer of right foot  Historical  Anxiety  Bone fusion  Cervical cancer  COPD (chronic obstructive pulmonary disease)  Cough  CS -  section  CVA (cerebral vascular accident)  Depression  DJD (degenerative joint disease)  Duodenal ulcer  History of bleeding ulcers  HLD (hyperlipidemia)  Procedure/Surgical History  Insertion of Infusion Device into Right Brachial Vein, Percutaneous Approach (2018), Insertion of peripherally inserted central venous catheter (PICC), without subcutaneous port or pump; age 5 years or older (2018), Transfusion of Nonautologous Antihemophilic Factors into Central Artery, Open Approach (2018), Transfusion, blood or blood components (2018), Insertion of Infusion Device into Right Internal Jugular Vein, Percutaneous Approach (2018), Insertion of peripherally inserted central venous catheter (PICC), without subcutaneous port or pump; age 5 years or older (2018), Insertion of Endotracheal Airway into Trachea, Via Natural or Artificial Opening (2018), Respiratory Ventilation, Less than 24 Consecutive Hours (2018), Repair Right Lower Leg Skin, External Approach (2018), Simple repair of superficial wounds of scalp, neck, axillae, external genitalia,  trunk and/or extremities (including hands and feet); 2.6 cm to 7.5 cm (02/12/2018), Insertion of Endotracheal Airway into Trachea, Via Natural or Artificial Opening Endoscopic (10/10/2017), Respiratory Ventilation, 24-96 Consecutive Hours (10/10/2017), Insertion of Endotracheal Airway into Trachea, Via Natural or Artificial Opening Endoscopic (05/21/2017), Respiratory Ventilation, Less than 24 Consecutive Hours (05/21/2017), Drainage of Right Pleural Cavity with Drainage Device, Percutaneous Approach (12/15/2016), Insertion of Endotracheal Airway into Trachea, Via Natural or Artificial Opening (01/04/2016), Respiratory Ventilation, 24-96 Consecutive Hours (01/04/2016), Excision of Abdomen Skin, External Approach (11/11/2015), Excision, benign lesion including margins, except skin tag (unless listed elsewhere), trunk, arms or legs; excised diameter over 4.0 cm (11/11/2015), Incision & Drainage Major (.) (11/11/2015), Aspiration of skin and subcutaneous tissue (08/07/2015), Puncture aspiration of abscess, hematoma, bulla, or cyst (08/07/2015), Venous catheterization, not elsewhere classified (03/12/2014), Percutaneous abdominal drainage (12/11/2013), Gastrectomy (11/12/2013), Injection or infusion of other therapeutic or prophylactic substance (11/12/2013), Other selective vagotomy (11/12/2013), Partial gastrectomy with anastomosis to jejunum (11/12/2013), Vagotomy (11/12/2013), Biopsy Gastrointestional (10/15/2013), Esophagogastroduodenoscopy (10/15/2013), Esophagogastroduodenoscopy [EGD] with closed biopsy (10/15/2013), Upper gastrointestinal endoscopy including esophagus, stomach, and either the duodenum and/or jejunum as appropriate; with biopsy, single or multiple (10/15/2013), Endoscopic sphincterotomy and papillotomy (07/18/2013), Other closed [endoscopic] biopsy of biliary duct or sphincter of Oddi (07/18/2013), Esophagogastroduodenoscopy [EGD] with closed biopsy (07/16/2013), Biopsy of bone marrow  (07/15/2013), Cervical discectomy (),  section, Cholecystectomy.  Medications  ACETAMINOPHEN/COD #3 (300/30MG) TAB, 1 tab(s), Oral, BID,? ?Not Taking, Completed Rx  ALBUTEROL 0.083%(2.5MG/3ML) 60X3ML, 2.5 mg= 3 mL, NEB, q6hr, PRN  AMOX-CLAV 875-125 MG TABLET, 1 tab(s), Oral, BID,? ?Not Taking, Completed Rx  Breo Ellipta 100 mcg-25 mcg/inh inhalation powder, 1 puff(s), INH, Daily  BREO ELLIPTA 200-25 MCG INH  busPIRone 15 mg oral tablet, 15 mg= 1 tab(s), Oral, BID  CEFUROXIME 250MG TABLETS,? ?Not Taking, Completed Rx  CEPHALEXIN 500MG CAPSULES, 500 mg= 1 cap(s), Oral, BID,? ?Not Taking, Completed Rx  cholecalciferol 50,000 intl units oral capsule, 43198 IntUnit= 1 cap(s), Oral, qWeek,? ?Unable to obtain  cyclobenzaprine 15 mg oral capsule, extended release, 15 mg= 1 cap(s), Oral, Daily, PRN  FERROUS SULFATE 325MG (5GR) TABS, 325 mg= 1 tab(s), Oral, BID  HYDROCHLOROTHIAZIDE 25 MG TAB, 25 mg= 1 tab(s), Oral, Daily  HYDROCODONE-ACETAMIN 5-325 MG, 1 tab(s), Oral, BID,? ?Not Taking, Completed Rx  HYDROCODONE-ACETAMIN 7.5-325, 1 tab(s), Oral, BID,? ?Not Taking, Completed Rx  IBUPROFEN 800 MG TABLET, 800 mg= 1 tab(s), Oral, QID,? ?Not Taking, Completed Rx  ipratropium-albuterol 0.5 mg-3 mg/3 mL inhalation NEB solution  KETOROLAC 10 MG TABLET,? ?Not Taking, Completed Rx  LEVETIRACETAM 500 MG TABLET, 500 mg= 1 tab(s), Oral, BID  LIDOCAINE-PRILOCAINE CREAM  LISINOPRIL 10 MG TABLET, 10 mg= 1 tab(s), Oral, Daily,? ?Not Taking per Prescriber  Lyrica 100 mg oral capsule, 100 mg= 1 cap(s), Oral, TID,? ?Not Taking per Prescriber  LYRICA 150MG CAPSULES, 150 mg= 1 cap(s), Oral, TID,? ?Investigating  MELOXICAM 7.5 MG TABLET, 7.5 mg= 1 tab(s), Oral, BID,? ?Not taking  METHOCARBAMOL 500 MG TABLET, 1000 mg= 2 tab(s), Oral, QID,? ?Not Taking, Completed Rx  mirtazapine 15 mg oral tablet, 15 mg= 1 tab(s), Oral, Once a day (at bedtime)  MUPIROCIN 2% OINTMENT, TOP, TID  Myrbetriq 25 mg oral tablet, extended release, 25 mg= 1  tab(s), Oral, Daily  MYRBETRIQ 50MG TABLETS 90S, 50 mg= 1 tab(s), Oral, Daily,? ?Not taking  NICOTINE 2MG LOZENGES 108S,? ?Not taking  NICOTINE TD 21MG/24H PATCH, 1 patch(es), TOP, Daily,? ?Not taking  Normal Saline (0.9% NS) IV, 1000 mL, IV, Once  pantoprazole 40 mg tablet,delayed release (DR/EC)  PHENYTOIN SOD  MG CAP, 300 mg= 3 cap(s), Oral, qPM,? ?Unable to obtain  PREDNISONE 20 MG TABLET, 20 mg= 1 tab(s), Oral, Daily,? ?Not Taking, Completed Rx  Spiriva 18 mcg inhalation capsule, 18 mcg= 1 cap(s), INH, Daily,? ?Unable to obtain  tramadol 50 mg tablet,? ?Not Taking, Completed Rx  venlafaxine 150 mg capsule,extended release 24hr  VENLAFAXINE ER 75MG CAPSULES,? ?Not taking  Ventolin HFA 90 mcg/inh inhalation aerosol, 180 mcg= 2 puff(s), INH, QID, PRN  VOLTAREN 1% GEL,? ?Not taking  Allergies  No Known Allergies  No Known Medication Allergies  Social History  Alcohol - Denies Alcohol Use, 10/15/2013  Never, 02/04/2018  Employment/School - Not employed or in school, 12/21/2013  Home/Environment  Lives with Siblings. Living situation: Home with assistance., 12/21/2013  Nutrition/Health  Regular, 12/21/2013  Substance Abuse - Denies Substance Abuse, 10/15/2013  Never, 02/04/2018  Tobacco - High Risk, 10/15/2013  Current, Cigarettes, 20 per day. 40 year(s). Started age 18.0 Years., 12/21/2013  Family History  Acute myocardial infarction.: Father.  Diabetes mellitus type 2: Mother.  Hypertension.: Mother.  Peripheral vascular disease.: Mother.  Immunizations  Vaccine Date Status Comments   tetanus/diphtheria/pertussis, acel(Tdap) 02/12/2018 Given    influenza virus vaccine, inactivated - Not Given Patient Refuses     pneumococcal 23-polyvalent vaccine 11/15/2013 Given Nursing Judgment   pneumococcal 23-polyvalent vaccine - Not Given Contraindicated - Do not give     influenza virus vaccine, inactivated - Not Given Patient Refused     Health Maintenance  Health Maintenance  ???Pending?(in the next year)  ???  ??Due?  ??? ? ? ?Alcohol Misuse Screening due??07/03/18??and every 1??year(s)  ??? ? ? ?Aspirin Therapy for CVD Prevention due??07/03/18??and every 1??year(s)  ??? ? ? ?Breast Cancer Screening due??07/03/18??Variable frequency  ??? ? ? ?Cervical Cancer Screening due??07/03/18??and every 5??year(s)  ??? ? ? ?Depression Screening due??07/03/18??and every 1??year(s)  ??? ? ? ?Lipid Screening due??07/03/18??Variable frequency  ??? ? ? ?Zoster Vaccine due??07/03/18??and every 100??year(s)  ??? ??Due In Future?  ??? ? ? ?Influenza Vaccine not due until??10/02/18??and every 1??year(s)  ??? ? ? ?Body Mass Index Check not due until??02/13/19??and every 1??year(s)  ??? ? ? ?Colorectal Screening not due until??04/12/19??and every 1??year(s)  ??? ? ? ?Obesity Screening not due until??05/17/19??and every 1??year(s)  ???Satisfied?(in the past 1 year)  ??? ??Satisfied?  ??? ? ? ?Blood Pressure Screening on??07/03/18.??Satisfied by Evelio Nettles LPN  ??? ? ? ?Body Mass Index Check on??02/13/18.??Satisfied by Matthew Zaragoza RN  ??? ? ? ?Colorectal Screening on??04/12/18.??Satisfied by Regan Curry  ??? ? ? ?Diabetes Screening on??05/16/18.??Satisfied by Margareth Fragoso  ??? ? ? ?Hypertension Management-Blood Pressure on??07/03/18.??Satisfied by Evelio Nettles LPN  ??? ? ? ?Influenza Vaccine on??02/06/18.??Satisfied by Finn Grimes MD  ??? ? ? ?Obesity Screening on??05/17/18.??Satisfied by Elina Mcbride CNA  ??? ? ? ?Tetanus Vaccine on??02/12/18.??Satisfied by Ignacio ST, Camille  ??? ? ? ?Tobacco Use Screening on??07/03/18.??Satisfied by Evelio Nettles LPN  ?  ?